# Patient Record
Sex: MALE | Race: BLACK OR AFRICAN AMERICAN | Employment: OTHER | URBAN - METROPOLITAN AREA
[De-identification: names, ages, dates, MRNs, and addresses within clinical notes are randomized per-mention and may not be internally consistent; named-entity substitution may affect disease eponyms.]

---

## 2018-09-01 ENCOUNTER — HOSPITAL ENCOUNTER (EMERGENCY)
Age: 28
Discharge: HOME | End: 2018-09-01
Attending: EMERGENCY MEDICINE
Payer: MEDICARE

## 2018-09-01 ENCOUNTER — APPOINTMENT (OUTPATIENT)
Dept: GENERAL RADIOLOGY | Age: 28
End: 2018-09-01
Payer: MEDICARE

## 2018-09-01 VITALS
RESPIRATION RATE: 16 BRPM | OXYGEN SATURATION: 100 % | TEMPERATURE: 98.7 F | WEIGHT: 302 LBS | SYSTOLIC BLOOD PRESSURE: 151 MMHG | HEART RATE: 107 BPM | HEIGHT: 71 IN | DIASTOLIC BLOOD PRESSURE: 94 MMHG | BODY MASS INDEX: 42.28 KG/M2

## 2018-09-01 DIAGNOSIS — R06.00 DYSPNEA, UNSPECIFIED TYPE: ICD-10-CM

## 2018-09-01 DIAGNOSIS — R73.9 HYPERGLYCEMIA: ICD-10-CM

## 2018-09-01 DIAGNOSIS — R05.9 COUGH: Primary | ICD-10-CM

## 2018-09-01 LAB
ANION GAP SERPL CALCULATED.3IONS-SCNC: 14 MMOL/L (ref 3–16)
BASOPHILS ABSOLUTE: 0 K/UL (ref 0–0.2)
BASOPHILS RELATIVE PERCENT: 0.6 %
BUN BLDV-MCNC: 9 MG/DL (ref 7–20)
CALCIUM SERPL-MCNC: 9.8 MG/DL (ref 8.3–10.6)
CHLORIDE BLD-SCNC: 94 MMOL/L (ref 99–110)
CO2: 25 MMOL/L (ref 21–32)
CREAT SERPL-MCNC: 0.7 MG/DL (ref 0.9–1.3)
EOSINOPHILS ABSOLUTE: 0.1 K/UL (ref 0–0.6)
EOSINOPHILS RELATIVE PERCENT: 1.9 %
GFR AFRICAN AMERICAN: >60
GFR NON-AFRICAN AMERICAN: >60
GLUCOSE BLD-MCNC: 329 MG/DL (ref 70–99)
HCT VFR BLD CALC: 41 % (ref 40.5–52.5)
HEMOGLOBIN: 13.4 G/DL (ref 13.5–17.5)
LYMPHOCYTES ABSOLUTE: 1.6 K/UL (ref 1–5.1)
LYMPHOCYTES RELATIVE PERCENT: 29.9 %
MCH RBC QN AUTO: 26.2 PG (ref 26–34)
MCHC RBC AUTO-ENTMCNC: 32.8 G/DL (ref 31–36)
MCV RBC AUTO: 80 FL (ref 80–100)
MONOCYTES ABSOLUTE: 0.2 K/UL (ref 0–1.3)
MONOCYTES RELATIVE PERCENT: 4.2 %
NEUTROPHILS ABSOLUTE: 3.5 K/UL (ref 1.7–7.7)
NEUTROPHILS RELATIVE PERCENT: 63.4 %
PDW BLD-RTO: 14.3 % (ref 12.4–15.4)
PLATELET # BLD: 405 K/UL (ref 135–450)
PMV BLD AUTO: 7.1 FL (ref 5–10.5)
POTASSIUM SERPL-SCNC: 4.4 MMOL/L (ref 3.5–5.1)
RBC # BLD: 5.12 M/UL (ref 4.2–5.9)
SODIUM BLD-SCNC: 133 MMOL/L (ref 136–145)
TROPONIN: <0.01 NG/ML
WBC # BLD: 5.5 K/UL (ref 4–11)

## 2018-09-01 PROCEDURE — 71045 X-RAY EXAM CHEST 1 VIEW: CPT

## 2018-09-01 PROCEDURE — 80048 BASIC METABOLIC PNL TOTAL CA: CPT

## 2018-09-01 PROCEDURE — 93005 ELECTROCARDIOGRAM TRACING: CPT | Performed by: EMERGENCY MEDICINE

## 2018-09-01 PROCEDURE — 99285 EMERGENCY DEPT VISIT HI MDM: CPT

## 2018-09-01 PROCEDURE — 84484 ASSAY OF TROPONIN QUANT: CPT

## 2018-09-01 PROCEDURE — 85025 COMPLETE CBC W/AUTO DIFF WBC: CPT

## 2018-09-01 ASSESSMENT — ENCOUNTER SYMPTOMS
ABDOMINAL PAIN: 0
SHORTNESS OF BREATH: 1
COUGH: 1
WHEEZING: 0

## 2018-09-01 NOTE — ED TRIAGE NOTES
Pt arrived to ED with complaints of productive cough for the past couple days. Last night while in parking lot had shortness of breath. States he has trouble breathing in high humidity. Denies SOB at this time. States \"I feel like I have a virus\". Pt currently sitting up in home wheelchair with no respiratory distress noted.

## 2018-09-01 NOTE — ED PROVIDER NOTES
[address] [facility]          ATTENDING PHYSICIAN NOTE       Date of evaluation: 9/1/2018    Chief Complaint     Cough (productive cough for the past couple days, feels like he has a cold) and Shortness of Breath (last night)      History of Present Illness     Emily Branch is a 25 y.o. 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 Patient reports over the past 2-3 days he has developed cough now becoming productive without hemoptysis. Patient also reports that he has dyspnea intermittently. Patient denies any chest pain, syncope, palpitations, fever. No previous history of blood clot. No recent trauma. Review of Systems     Review of Systems   Constitutional: Negative for fever. Respiratory: Positive for cough and shortness of breath. Negative for wheezing. Cardiovascular: Negative for chest pain and leg swelling. Gastrointestinal: Negative for abdominal pain. Neurological: Negative for syncope and light-headedness. All other systems reviewed and are negative. Past Medical, Surgical, Family, and Social History     He has a past medical history of Asthma; Hypertension; and Paralysis ([facility] [address] He has a past surgical history that includes Spine surgery (2009). His family history includes Asthma in his father; Diabetes in his father and mother; High Blood Pressure in his father. He reports that he has never smoked. He has never used smokeless tobacco. He reports that he does not drink alcohol or use drugs. Medications     Previous Medications    ACETAMINOPHEN (TYLENOL) 500 MG TABLET    Take 500 mg by mouth as needed. BACLOFEN (LIORESAL) 20 MG TABLET    Take 20 mg by mouth 3 times daily. CHOLECALCIFEROL (VITAMIN D3) 1000 UNITS TABS    TAKE 2 TABLETS BY MOUTH DAILY. GABAPENTIN (NEURONTIN) 600 MG TABLET    Take 600 mg by mouth 3 times daily.

## 2018-09-13 LAB
EKG ATRIAL RATE: 99 BPM
EKG DIAGNOSIS: NORMAL
EKG P AXIS: 127 DEGREES
EKG P-R INTERVAL: 138 MS
EKG Q-T INTERVAL: 342 MS
EKG QRS DURATION: 84 MS
EKG QTC CALCULATION (BAZETT): 438 MS
EKG R AXIS: 197 DEGREES
EKG T AXIS: 137 DEGREES
EKG VENTRICULAR RATE: 99 BPM

## 2024-08-06 ENCOUNTER — APPOINTMENT (OUTPATIENT)
Dept: GENERAL RADIOLOGY | Age: 34
End: 2024-08-06
Payer: MEDICARE

## 2024-08-06 ENCOUNTER — APPOINTMENT (OUTPATIENT)
Dept: CT IMAGING | Age: 34
End: 2024-08-06
Payer: MEDICARE

## 2024-08-06 ENCOUNTER — HOSPITAL ENCOUNTER (INPATIENT)
Age: 34
LOS: 3 days | Discharge: NURSING, REHAB OR LONG-TERM CARE | End: 2024-08-09
Attending: EMERGENCY MEDICINE | Admitting: INTERNAL MEDICINE
Payer: MEDICARE

## 2024-08-06 DIAGNOSIS — D64.9 ACUTE ANEMIA: Primary | ICD-10-CM

## 2024-08-06 DIAGNOSIS — R00.2 PALPITATIONS: ICD-10-CM

## 2024-08-06 PROBLEM — G82.20 PARAPLEGIA (HCC): Status: ACTIVE | Noted: 2024-08-06

## 2024-08-06 LAB
AMORPH SED URNS QL MICRO: ABNORMAL /HPF
ANION GAP SERPL CALCULATED.3IONS-SCNC: 16 MMOL/L (ref 3–16)
BACTERIA URNS QL MICRO: ABNORMAL /HPF
BASOPHILS # BLD: 0.1 K/UL (ref 0–0.2)
BASOPHILS NFR BLD: 1.3 %
BILIRUB UR QL STRIP.AUTO: NEGATIVE
BUN SERPL-MCNC: 21 MG/DL (ref 7–20)
CALCIUM SERPL-MCNC: 9.3 MG/DL (ref 8.3–10.6)
CHLORIDE SERPL-SCNC: 100 MMOL/L (ref 99–110)
CLARITY UR: CLEAR
CO2 SERPL-SCNC: 22 MMOL/L (ref 21–32)
COARSE GRAN CASTS #/AREA URNS LPF: ABNORMAL /LPF (ref 0–2)
COLOR UR: YELLOW
CREAT SERPL-MCNC: 1.3 MG/DL (ref 0.9–1.3)
DEPRECATED RDW RBC AUTO: 16.2 % (ref 12.4–15.4)
EKG ATRIAL RATE: 93 BPM
EKG DIAGNOSIS: NORMAL
EKG P AXIS: 73 DEGREES
EKG P-R INTERVAL: 152 MS
EKG Q-T INTERVAL: 366 MS
EKG QRS DURATION: 80 MS
EKG QTC CALCULATION (BAZETT): 455 MS
EKG R AXIS: 70 DEGREES
EKG T AXIS: 66 DEGREES
EKG VENTRICULAR RATE: 93 BPM
EOSINOPHIL # BLD: 0.2 K/UL (ref 0–0.6)
EOSINOPHIL NFR BLD: 2.1 %
EPI CELLS #/AREA URNS HPF: ABNORMAL /HPF (ref 0–5)
GFR SERPLBLD CREATININE-BSD FMLA CKD-EPI: 73 ML/MIN/{1.73_M2}
GLUCOSE SERPL-MCNC: 133 MG/DL (ref 70–99)
GLUCOSE UR STRIP.AUTO-MCNC: NEGATIVE MG/DL
HCT VFR BLD AUTO: 20.7 % (ref 40.5–52.5)
HGB BLD-MCNC: 6.5 G/DL (ref 13.5–17.5)
HGB UR QL STRIP.AUTO: ABNORMAL
KETONES UR STRIP.AUTO-MCNC: NEGATIVE MG/DL
LEUKOCYTE ESTERASE UR QL STRIP.AUTO: ABNORMAL
LYMPHOCYTES # BLD: 2 K/UL (ref 1–5.1)
LYMPHOCYTES NFR BLD: 22.5 %
MCH RBC QN AUTO: 26.9 PG (ref 26–34)
MCHC RBC AUTO-ENTMCNC: 31.3 G/DL (ref 31–36)
MCV RBC AUTO: 86.1 FL (ref 80–100)
MONOCYTES # BLD: 0.4 K/UL (ref 0–1.3)
MONOCYTES NFR BLD: 4.6 %
NEUTROPHILS # BLD: 6.1 K/UL (ref 1.7–7.7)
NEUTROPHILS NFR BLD: 69.5 %
NITRITE UR QL STRIP.AUTO: NEGATIVE
PH UR STRIP.AUTO: 6 [PH] (ref 5–8)
PLATELET # BLD AUTO: 716 K/UL (ref 135–450)
PMV BLD AUTO: 6.9 FL (ref 5–10.5)
POTASSIUM SERPL-SCNC: 4.4 MMOL/L (ref 3.5–5.1)
PROT UR STRIP.AUTO-MCNC: >=300 MG/DL
RBC # BLD AUTO: 2.4 M/UL (ref 4.2–5.9)
RBC #/AREA URNS HPF: ABNORMAL /HPF (ref 0–4)
SODIUM SERPL-SCNC: 138 MMOL/L (ref 136–145)
SP GR UR STRIP.AUTO: >=1.03 (ref 1–1.03)
UA COMPLETE W REFLEX CULTURE PNL UR: ABNORMAL
UA DIPSTICK W REFLEX MICRO PNL UR: YES
URN SPEC COLLECT METH UR: ABNORMAL
UROBILINOGEN UR STRIP-ACNC: 2 E.U./DL
WBC # BLD AUTO: 8.8 K/UL (ref 4–11)
WBC #/AREA URNS HPF: ABNORMAL /HPF (ref 0–5)

## 2024-08-06 PROCEDURE — 74177 CT ABD & PELVIS W/CONTRAST: CPT

## 2024-08-06 PROCEDURE — 1200000000 HC SEMI PRIVATE

## 2024-08-06 PROCEDURE — 87088 URINE BACTERIA CULTURE: CPT

## 2024-08-06 PROCEDURE — 36415 COLL VENOUS BLD VENIPUNCTURE: CPT

## 2024-08-06 PROCEDURE — 81001 URINALYSIS AUTO W/SCOPE: CPT

## 2024-08-06 PROCEDURE — 6370000000 HC RX 637 (ALT 250 FOR IP): Performed by: STUDENT IN AN ORGANIZED HEALTH CARE EDUCATION/TRAINING PROGRAM

## 2024-08-06 PROCEDURE — 87186 SC STD MICRODIL/AGAR DIL: CPT

## 2024-08-06 PROCEDURE — 85025 COMPLETE CBC W/AUTO DIFF WBC: CPT

## 2024-08-06 PROCEDURE — 99285 EMERGENCY DEPT VISIT HI MDM: CPT

## 2024-08-06 PROCEDURE — 71045 X-RAY EXAM CHEST 1 VIEW: CPT

## 2024-08-06 PROCEDURE — 83540 ASSAY OF IRON: CPT

## 2024-08-06 PROCEDURE — 6360000004 HC RX CONTRAST MEDICATION: Performed by: STUDENT IN AN ORGANIZED HEALTH CARE EDUCATION/TRAINING PROGRAM

## 2024-08-06 PROCEDURE — 87086 URINE CULTURE/COLONY COUNT: CPT

## 2024-08-06 PROCEDURE — 80048 BASIC METABOLIC PNL TOTAL CA: CPT

## 2024-08-06 PROCEDURE — 83550 IRON BINDING TEST: CPT

## 2024-08-06 PROCEDURE — 93005 ELECTROCARDIOGRAM TRACING: CPT | Performed by: STUDENT IN AN ORGANIZED HEALTH CARE EDUCATION/TRAINING PROGRAM

## 2024-08-06 RX ORDER — SODIUM CHLORIDE 9 MG/ML
INJECTION, SOLUTION INTRAVENOUS PRN
Status: DISCONTINUED | OUTPATIENT
Start: 2024-08-06 | End: 2024-08-06

## 2024-08-06 RX ORDER — LISINOPRIL AND HYDROCHLOROTHIAZIDE 12.5; 1 MG/1; MG/1
1 TABLET ORAL DAILY
Status: DISCONTINUED | OUTPATIENT
Start: 2024-08-07 | End: 2024-08-06

## 2024-08-06 RX ORDER — VITAMIN B COMPLEX
2000 TABLET ORAL DAILY
Status: DISCONTINUED | OUTPATIENT
Start: 2024-08-07 | End: 2024-08-09 | Stop reason: HOSPADM

## 2024-08-06 RX ORDER — ACETAMINOPHEN 650 MG/1
650 SUPPOSITORY RECTAL EVERY 6 HOURS PRN
Status: DISCONTINUED | OUTPATIENT
Start: 2024-08-06 | End: 2024-08-07

## 2024-08-06 RX ORDER — ONDANSETRON 4 MG/1
4 TABLET, ORALLY DISINTEGRATING ORAL EVERY 8 HOURS PRN
Status: DISCONTINUED | OUTPATIENT
Start: 2024-08-06 | End: 2024-08-09 | Stop reason: HOSPADM

## 2024-08-06 RX ORDER — TIZANIDINE 4 MG/1
4 TABLET ORAL ONCE
Status: COMPLETED | OUTPATIENT
Start: 2024-08-06 | End: 2024-08-06

## 2024-08-06 RX ORDER — POLYETHYLENE GLYCOL 3350 17 G/17G
17 POWDER, FOR SOLUTION ORAL DAILY PRN
Status: DISCONTINUED | OUTPATIENT
Start: 2024-08-06 | End: 2024-08-09 | Stop reason: HOSPADM

## 2024-08-06 RX ORDER — ONDANSETRON 2 MG/ML
4 INJECTION INTRAMUSCULAR; INTRAVENOUS EVERY 6 HOURS PRN
Status: DISCONTINUED | OUTPATIENT
Start: 2024-08-06 | End: 2024-08-09 | Stop reason: HOSPADM

## 2024-08-06 RX ORDER — BACLOFEN 10 MG/1
20 TABLET ORAL 3 TIMES DAILY
Status: DISCONTINUED | OUTPATIENT
Start: 2024-08-06 | End: 2024-08-09 | Stop reason: HOSPADM

## 2024-08-06 RX ORDER — NITROFURANTOIN 25; 75 MG/1; MG/1
100 CAPSULE ORAL 2 TIMES DAILY
Status: ON HOLD | COMMUNITY
End: 2024-08-09 | Stop reason: HOSPADM

## 2024-08-06 RX ORDER — BISACODYL 10 MG
10 SUPPOSITORY, RECTAL RECTAL DAILY
COMMUNITY

## 2024-08-06 RX ORDER — LISINOPRIL 10 MG/1
10 TABLET ORAL DAILY
Status: DISCONTINUED | OUTPATIENT
Start: 2024-08-07 | End: 2024-08-07

## 2024-08-06 RX ORDER — ENOXAPARIN SODIUM 100 MG/ML
30 INJECTION SUBCUTANEOUS 2 TIMES DAILY
Status: DISCONTINUED | OUTPATIENT
Start: 2024-08-06 | End: 2024-08-07

## 2024-08-06 RX ORDER — ACETAMINOPHEN 325 MG/1
650 TABLET ORAL EVERY 6 HOURS PRN
Status: DISCONTINUED | OUTPATIENT
Start: 2024-08-06 | End: 2024-08-07

## 2024-08-06 RX ORDER — SODIUM CHLORIDE 9 MG/ML
INJECTION, SOLUTION INTRAVENOUS PRN
Status: DISCONTINUED | OUTPATIENT
Start: 2024-08-06 | End: 2024-08-09 | Stop reason: HOSPADM

## 2024-08-06 RX ORDER — AMLODIPINE BESYLATE 10 MG/1
10 TABLET ORAL DAILY
COMMUNITY

## 2024-08-06 RX ORDER — GABAPENTIN 600 MG/1
600 TABLET ORAL 3 TIMES DAILY
Status: DISCONTINUED | OUTPATIENT
Start: 2024-08-06 | End: 2024-08-09 | Stop reason: HOSPADM

## 2024-08-06 RX ORDER — BACLOFEN 10 MG/1
20 TABLET ORAL ONCE
Status: COMPLETED | OUTPATIENT
Start: 2024-08-06 | End: 2024-08-06

## 2024-08-06 RX ORDER — POTASSIUM CHLORIDE 7.45 MG/ML
10 INJECTION INTRAVENOUS PRN
Status: DISCONTINUED | OUTPATIENT
Start: 2024-08-06 | End: 2024-08-09 | Stop reason: HOSPADM

## 2024-08-06 RX ORDER — SODIUM CHLORIDE 0.9 % (FLUSH) 0.9 %
5-40 SYRINGE (ML) INJECTION EVERY 12 HOURS SCHEDULED
Status: DISCONTINUED | OUTPATIENT
Start: 2024-08-06 | End: 2024-08-09 | Stop reason: HOSPADM

## 2024-08-06 RX ORDER — HYDROCHLOROTHIAZIDE 25 MG/1
12.5 TABLET ORAL DAILY
Status: DISCONTINUED | OUTPATIENT
Start: 2024-08-07 | End: 2024-08-07

## 2024-08-06 RX ORDER — POTASSIUM CHLORIDE 20 MEQ/1
40 TABLET, EXTENDED RELEASE ORAL PRN
Status: DISCONTINUED | OUTPATIENT
Start: 2024-08-06 | End: 2024-08-09 | Stop reason: HOSPADM

## 2024-08-06 RX ORDER — SODIUM CHLORIDE 0.9 % (FLUSH) 0.9 %
5-40 SYRINGE (ML) INJECTION PRN
Status: DISCONTINUED | OUTPATIENT
Start: 2024-08-06 | End: 2024-08-09 | Stop reason: HOSPADM

## 2024-08-06 RX ORDER — MAGNESIUM SULFATE IN WATER 40 MG/ML
2000 INJECTION, SOLUTION INTRAVENOUS PRN
Status: DISCONTINUED | OUTPATIENT
Start: 2024-08-06 | End: 2024-08-09 | Stop reason: HOSPADM

## 2024-08-06 RX ADMIN — IOPAMIDOL 75 ML: 755 INJECTION, SOLUTION INTRAVENOUS at 19:12

## 2024-08-06 RX ADMIN — TIZANIDINE 4 MG: 4 TABLET ORAL at 15:50

## 2024-08-06 RX ADMIN — BACLOFEN 20 MG: 10 TABLET ORAL at 15:51

## 2024-08-06 ASSESSMENT — PAIN SCALES - GENERAL
PAINLEVEL_OUTOF10: 0
PAINLEVEL_OUTOF10: 7

## 2024-08-06 ASSESSMENT — PAIN - FUNCTIONAL ASSESSMENT: PAIN_FUNCTIONAL_ASSESSMENT: 0-10

## 2024-08-06 ASSESSMENT — LIFESTYLE VARIABLES
HOW MANY STANDARD DRINKS CONTAINING ALCOHOL DO YOU HAVE ON A TYPICAL DAY: PATIENT DOES NOT DRINK
HOW OFTEN DO YOU HAVE A DRINK CONTAINING ALCOHOL: NEVER

## 2024-08-06 NOTE — CONSENT
Informed Refusal for Blood Component Transfusion Note    I have discussed with the patient and mother the rationale for blood component transfusion; its benefits in treating or preventing fatigue, organ damage, or death; and its risk which includes mild transfusion reactions, rare risk of blood borne infection, or more serious but rare reactions. I have discussed the alternatives to transfusion, including the risk and consequences of not receiving transfusion. The patient and mother had an opportunity to ask questions and had REFUSED to proceed with transfusion of packed red blood cells. Patient's mother is a Bahai and patient is affiliated w/ the same james. Discussed alternatives to blood transfusion and patient would prefer to try iron supplementation prior to transfusion.       Electronically signed by Jw Winchester MD on 8/6/24 at 5:10 PM EDT

## 2024-08-06 NOTE — ED PROVIDER NOTES
ED Attending Attestation Note     Date of evaluation: 8/6/2024    This patient was seen by the resident.  I have seen and examined the patient, agree with the workup, evaluation, management and diagnosis. The care plan has been discussed.  I have reviewed the ECG and concur with the resident's interpretation.  My assessment reveals adult male in no acute distress, here initially for evaluation of hypertension, but noted on labs to have significant anemia, hemoglobin 6.5.  He denies any significant bleeding recently, nothing history wise that would seem to explain why he would be is critically anemic as he was found to be.  Denies SOB/ CP/ fatigue.  Risk/ benefit of blood transfusion was discussed, pt wishes to defer blood transfusion at this time.        Dane Vargas MD  08/06/24 2025

## 2024-08-06 NOTE — ED PROVIDER NOTES
THE [facility]  EMERGENCY DEPARTMENT ENCOUNTER          EM RESIDENT NOTE       Date of evaluation: 8/6/2024    Chief Complaint     Hypertension (Patient comes to the ED today for hypertension that started about two days ago. )    History of Present Illness     Russell Livingston is a 33 y.o. male w/ PMHx paraplegia 2/2 T10/T11 traumatic SCI, asthma, HTN, recently seen at urgent care for dark, malodorous urine and dx'd w/ UTI (completed Macrobid x3 days) who presents via EMS for evaluation of multiple complaints.  Has noted intermittent palpitations over last several days that prompted him to check his BP at home today, noted elevation to SBP 170s.  Called EMS and SBP elevated to 210s on initial evaluation.  Denies HA, vision changes, mental status changes, chest pain, dyspnea, presyncope/syncope, fever, cough a/w palpitations but reports they have become more frequent since taking Macrobid.  Additionally reports that he was initially prescribed a longer course of Macrobid but was instructed to stop taking this medication d/t flushing/heated sensation c/f allergic reaction.  Denies associated fevers, abdominal pain, hematuria or N/V/D.  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     Additionally endorses progressive paraesthesias of his palmar R thumb and 2nd/3rd digits that began while helping his mother lift a pallet of plastic water bottles.  Felt something \"pop\" in his wrist at the time and has noted paraesthesias/numbness in affected fingers since.  Denies any recent falls, injury to neck or proximal RUE.  No wounds.  No dorsal sensory changes.     MEDICAL DECISION MAKING / ASSESSMENT / PLAN     INITIAL VITALS: BP: (!) 179/99, Temp: 99.2 °F (37.3 °C), Pulse: (!) 104, Respirations: 20, SpO2: 95 %    Russell Livingston is a 33 y.o. male presenting for evaluation of multiple

## 2024-08-06 NOTE — ED NOTES
Md Winchester notified of critical result of hemoglobin and hematocrit     Santos Mohamud, RN  08/06/24 6962

## 2024-08-07 LAB
ABO + RH BLD: NORMAL
ANION GAP SERPL CALCULATED.3IONS-SCNC: 13 MMOL/L (ref 3–16)
BASOPHILS # BLD: 0 K/UL (ref 0–0.2)
BASOPHILS NFR BLD: 0.6 %
BLD GP AB SCN SERPL QL: NORMAL
BUN SERPL-MCNC: 20 MG/DL (ref 7–20)
CALCIUM SERPL-MCNC: 8.8 MG/DL (ref 8.3–10.6)
CHLORIDE SERPL-SCNC: 101 MMOL/L (ref 99–110)
CO2 SERPL-SCNC: 24 MMOL/L (ref 21–32)
CREAT SERPL-MCNC: 1.3 MG/DL (ref 0.9–1.3)
DEPRECATED RDW RBC AUTO: 16 % (ref 12.4–15.4)
EOSINOPHIL # BLD: 0.2 K/UL (ref 0–0.6)
EOSINOPHIL NFR BLD: 2.8 %
FERRITIN SERPL IA-MCNC: 863.1 NG/ML (ref 30–400)
FOLATE SERPL-MCNC: 19.02 NG/ML (ref 4.78–24.2)
GFR SERPLBLD CREATININE-BSD FMLA CKD-EPI: 73 ML/MIN/{1.73_M2}
GLUCOSE SERPL-MCNC: 154 MG/DL (ref 70–99)
HCT VFR BLD AUTO: 18.5 % (ref 40.5–52.5)
HGB BLD-MCNC: 5.8 G/DL (ref 13.5–17.5)
IRON SATN MFR SERPL: 24 % (ref 20–50)
IRON SERPL-MCNC: 69 UG/DL (ref 59–158)
LDH SERPL L TO P-CCNC: 298 U/L (ref 100–190)
LYMPHOCYTES # BLD: 2.3 K/UL (ref 1–5.1)
LYMPHOCYTES NFR BLD: 34.1 %
MCH RBC QN AUTO: 26.8 PG (ref 26–34)
MCHC RBC AUTO-ENTMCNC: 31.3 G/DL (ref 31–36)
MCV RBC AUTO: 85.8 FL (ref 80–100)
MONOCYTES # BLD: 0.3 K/UL (ref 0–1.3)
MONOCYTES NFR BLD: 4.2 %
NEUTROPHILS # BLD: 3.9 K/UL (ref 1.7–7.7)
NEUTROPHILS NFR BLD: 58.3 %
PLATELET # BLD AUTO: 570 K/UL (ref 135–450)
PMV BLD AUTO: 6.6 FL (ref 5–10.5)
POTASSIUM SERPL-SCNC: 3.9 MMOL/L (ref 3.5–5.1)
RBC # BLD AUTO: 2.16 M/UL (ref 4.2–5.9)
SODIUM SERPL-SCNC: 138 MMOL/L (ref 136–145)
TIBC SERPL-MCNC: 293 UG/DL (ref 260–445)
VIT B12 SERPL-MCNC: 443 PG/ML (ref 211–911)
WBC # BLD AUTO: 6.6 K/UL (ref 4–11)

## 2024-08-07 PROCEDURE — 6370000000 HC RX 637 (ALT 250 FOR IP): Performed by: NURSE PRACTITIONER

## 2024-08-07 PROCEDURE — 1200000000 HC SEMI PRIVATE

## 2024-08-07 PROCEDURE — 83615 LACTATE (LD) (LDH) ENZYME: CPT

## 2024-08-07 PROCEDURE — 86900 BLOOD TYPING SEROLOGIC ABO: CPT

## 2024-08-07 PROCEDURE — 82668 ASSAY OF ERYTHROPOIETIN: CPT

## 2024-08-07 PROCEDURE — 2580000003 HC RX 258: Performed by: INTERNAL MEDICINE

## 2024-08-07 PROCEDURE — 82607 VITAMIN B-12: CPT

## 2024-08-07 PROCEDURE — 86850 RBC ANTIBODY SCREEN: CPT

## 2024-08-07 PROCEDURE — 83550 IRON BINDING TEST: CPT

## 2024-08-07 PROCEDURE — 85025 COMPLETE CBC W/AUTO DIFF WBC: CPT

## 2024-08-07 PROCEDURE — 82746 ASSAY OF FOLIC ACID SERUM: CPT

## 2024-08-07 PROCEDURE — 6360000002 HC RX W HCPCS: Performed by: INTERNAL MEDICINE

## 2024-08-07 PROCEDURE — 83540 ASSAY OF IRON: CPT

## 2024-08-07 PROCEDURE — 6370000000 HC RX 637 (ALT 250 FOR IP): Performed by: INTERNAL MEDICINE

## 2024-08-07 PROCEDURE — 80048 BASIC METABOLIC PNL TOTAL CA: CPT

## 2024-08-07 PROCEDURE — 82728 ASSAY OF FERRITIN: CPT

## 2024-08-07 PROCEDURE — 6360000002 HC RX W HCPCS: Performed by: NURSE PRACTITIONER

## 2024-08-07 PROCEDURE — 83010 ASSAY OF HAPTOGLOBIN QUANT: CPT

## 2024-08-07 PROCEDURE — 36415 COLL VENOUS BLD VENIPUNCTURE: CPT

## 2024-08-07 PROCEDURE — 86901 BLOOD TYPING SEROLOGIC RH(D): CPT

## 2024-08-07 RX ORDER — TIZANIDINE 4 MG/1
4 TABLET ORAL EVERY 8 HOURS PRN
Status: COMPLETED | OUTPATIENT
Start: 2024-08-07 | End: 2024-08-07

## 2024-08-07 RX ORDER — ACETAMINOPHEN 500 MG
1000 TABLET ORAL EVERY 6 HOURS PRN
Status: DISCONTINUED | OUTPATIENT
Start: 2024-08-07 | End: 2024-08-09 | Stop reason: HOSPADM

## 2024-08-07 RX ORDER — AMLODIPINE BESYLATE 10 MG/1
10 TABLET ORAL DAILY
Status: DISCONTINUED | OUTPATIENT
Start: 2024-08-07 | End: 2024-08-09 | Stop reason: HOSPADM

## 2024-08-07 RX ORDER — OXYBUTYNIN CHLORIDE 5 MG/1
5 TABLET ORAL 3 TIMES DAILY
Status: DISCONTINUED | OUTPATIENT
Start: 2024-08-07 | End: 2024-08-09 | Stop reason: HOSPADM

## 2024-08-07 RX ORDER — BISACODYL 5 MG/1
5 TABLET, DELAYED RELEASE ORAL DAILY PRN
Status: DISCONTINUED | OUTPATIENT
Start: 2024-08-07 | End: 2024-08-09 | Stop reason: HOSPADM

## 2024-08-07 RX ORDER — FOLIC ACID 1 MG/1
1 TABLET ORAL DAILY
Status: DISCONTINUED | OUTPATIENT
Start: 2024-08-07 | End: 2024-08-09 | Stop reason: HOSPADM

## 2024-08-07 RX ORDER — BISACODYL 10 MG
10 SUPPOSITORY, RECTAL RECTAL DAILY PRN
Status: DISCONTINUED | OUTPATIENT
Start: 2024-08-07 | End: 2024-08-09 | Stop reason: HOSPADM

## 2024-08-07 RX ORDER — CYANOCOBALAMIN 1000 UG/ML
1000 INJECTION, SOLUTION INTRAMUSCULAR; SUBCUTANEOUS DAILY
Status: DISCONTINUED | OUTPATIENT
Start: 2024-08-07 | End: 2024-08-09 | Stop reason: HOSPADM

## 2024-08-07 RX ORDER — OXYBUTYNIN CHLORIDE 5 MG/1
5 TABLET ORAL
Status: COMPLETED | OUTPATIENT
Start: 2024-08-07 | End: 2024-08-07

## 2024-08-07 RX ORDER — SODIUM CHLORIDE 9 MG/ML
INJECTION, SOLUTION INTRAVENOUS PRN
Status: DISCONTINUED | OUTPATIENT
Start: 2024-08-07 | End: 2024-08-07

## 2024-08-07 RX ORDER — TIZANIDINE 4 MG/1
4 TABLET ORAL 3 TIMES DAILY
Status: DISCONTINUED | OUTPATIENT
Start: 2024-08-07 | End: 2024-08-09 | Stop reason: HOSPADM

## 2024-08-07 RX ADMIN — OXYBUTYNIN CHLORIDE 5 MG: 5 TABLET ORAL at 15:44

## 2024-08-07 RX ADMIN — Medication 2000 UNITS: at 08:30

## 2024-08-07 RX ADMIN — TIZANIDINE 4 MG: 4 TABLET ORAL at 21:13

## 2024-08-07 RX ADMIN — BACLOFEN 20 MG: 10 TABLET ORAL at 08:32

## 2024-08-07 RX ADMIN — FOLIC ACID 1 MG: 1 TABLET ORAL at 14:00

## 2024-08-07 RX ADMIN — PIPERACILLIN AND TAZOBACTAM 4500 MG: 4; .5 INJECTION, POWDER, LYOPHILIZED, FOR SOLUTION INTRAVENOUS at 15:51

## 2024-08-07 RX ADMIN — GABAPENTIN 600 MG: 600 TABLET, FILM COATED ORAL at 00:37

## 2024-08-07 RX ADMIN — BACLOFEN 20 MG: 10 TABLET ORAL at 00:37

## 2024-08-07 RX ADMIN — CYANOCOBALAMIN 1000 MCG: 1000 INJECTION, SOLUTION INTRAMUSCULAR; SUBCUTANEOUS at 13:32

## 2024-08-07 RX ADMIN — TIZANIDINE 4 MG: 4 TABLET ORAL at 15:44

## 2024-08-07 RX ADMIN — SODIUM CHLORIDE, PRESERVATIVE FREE 10 ML: 5 INJECTION INTRAVENOUS at 09:29

## 2024-08-07 RX ADMIN — OXYBUTYNIN CHLORIDE 5 MG: 5 TABLET ORAL at 01:23

## 2024-08-07 RX ADMIN — SODIUM CHLORIDE 200 MG: 9 INJECTION, SOLUTION INTRAVENOUS at 18:57

## 2024-08-07 RX ADMIN — OXYBUTYNIN CHLORIDE 5 MG: 5 TABLET ORAL at 21:13

## 2024-08-07 RX ADMIN — EPOETIN ALFA-EPBX 10000 UNITS: 10000 INJECTION, SOLUTION INTRAVENOUS; SUBCUTANEOUS at 18:47

## 2024-08-07 RX ADMIN — GABAPENTIN 600 MG: 600 TABLET, FILM COATED ORAL at 08:30

## 2024-08-07 RX ADMIN — BACLOFEN 20 MG: 10 TABLET ORAL at 21:13

## 2024-08-07 RX ADMIN — GABAPENTIN 600 MG: 600 TABLET, FILM COATED ORAL at 21:13

## 2024-08-07 RX ADMIN — TIZANIDINE 4 MG: 4 TABLET ORAL at 10:41

## 2024-08-07 RX ADMIN — SODIUM CHLORIDE, PRESERVATIVE FREE 10 ML: 5 INJECTION INTRAVENOUS at 01:23

## 2024-08-07 RX ADMIN — AMLODIPINE BESYLATE 10 MG: 10 TABLET ORAL at 13:33

## 2024-08-07 RX ADMIN — BACLOFEN 20 MG: 10 TABLET ORAL at 15:44

## 2024-08-07 RX ADMIN — PIPERACILLIN AND TAZOBACTAM 3375 MG: 3; .375 INJECTION, POWDER, LYOPHILIZED, FOR SOLUTION INTRAVENOUS at 21:50

## 2024-08-07 RX ADMIN — SODIUM CHLORIDE, PRESERVATIVE FREE 10 ML: 5 INJECTION INTRAVENOUS at 21:13

## 2024-08-07 RX ADMIN — GABAPENTIN 600 MG: 600 TABLET, FILM COATED ORAL at 15:44

## 2024-08-07 RX ADMIN — TIZANIDINE 4 MG: 4 TABLET ORAL at 01:23

## 2024-08-07 ASSESSMENT — PAIN DESCRIPTION - LOCATION
LOCATION: LEG
LOCATION: LEG

## 2024-08-07 ASSESSMENT — PAIN SCALES - GENERAL
PAINLEVEL_OUTOF10: 4
PAINLEVEL_OUTOF10: 7

## 2024-08-07 ASSESSMENT — PAIN DESCRIPTION - DESCRIPTORS
DESCRIPTORS: DISCOMFORT
DESCRIPTORS: BURNING

## 2024-08-07 NOTE — CONSULTS
08/07/24  0447   *         Radiology Review:  CT ABDOMEN PELVIS W IV CONTRAST Additional Contrast? None  Narrative: EXAM: CT ABDOMEN AND PELVIS WITH CONTRAST    INDICATION: Unexplained acute anemia    COMPARISON: None.    TECHNIQUE: Axial CT imaging obtained from lung bases through pelvis. Axial images and multiplanar reformatted images are provided for review.   Individualized dose optimization technique was used in order to meet ALARA standards for radiation dose   reduction.  In addition to vendor specific dose reduction algorithms, the dose reduction techniques vary based on the specific scanner utilized but frequently include automated exposure control, adjustment of the mA and/or kV according to patient size,   and use of iterative reconstruction technique.    IV Contrast: 75 mL of Isovue 370  Oral Contrast: None.    FINDINGS:    LUNG BASES: Clear.    LIVER: Normal.    GALLBLADDER AND BILIARY TREE: No calcified gallstones. No gallbladder distention.  No intra- or extrahepatic biliary dilatation.    PANCREAS: Normal.    SPLEEN: Normal.    ADRENAL GLANDS: Normal.    KIDNEYS AND URETERS: No hydronephrosis. Normal enhancement. No urolithiasis.    URINARY BLADDER: Normal.    REPRODUCTIVE ORGANS: No mass.    BOWEL: Normal caliber.  Normal appendix.    LYMPH NODES: There are enlarged bilateral iliac lymph nodes. For example, left external iliac lymph node measures 2.6 x 1.7 cm on series 2 image 107. Right common iliac lymph node measures 2.4 x 1.6 cm on series 2 image 77. Again, but for    PERITONEUM/RETROPERITONEUM: No ascites or free air.    VESSELS: Aorta is normal caliber and proximal branch vessels are patent.  The inferior vena cava, hepatic veins and portal venous system are patent.    ABDOMINAL WALL: Normal.    BONES: No acute osseous process. Heterotopic ossifications are noted along the bilateral hips.    There is streak artifact from imaging with arms down position which limits  Discussed side effects with the patient and his mother     We discussed the normal blood parameters and I explained the seriousness of this level of anemia and potential risks associated with this     He could also have some component of anemia of chronic disease secondary to his HTN. Cr at upper limits of normal     Chronic UTI  He reports UTI symptoms beginning 9/2021   Completed a 3 day course of antibiotics last week   UA reveals moderate amount of blood, WBC, bacteria  Urine culture is pending   He straight caths due to his spinal cord injury  Will consult Urology for an opinion     HTN  Management per primary team     Thank you for asking me to see the patient.       Linda Mendoza, APRN - CNP  Please Contact Through Perfect Serve    55 mins total time was spent on this visit today which includes chart review, outside records review, placing orders, my exam of the patient, and documentation

## 2024-08-07 NOTE — H&P
Hospital Medicine History & Physical      PCP: Unknown, Provider, PETE - NP    Date of Admission: 8/6/2024    Date of Service: Pt seen/examined on 8/6/2024 and Admitted to Inpatient with expected LOS greater than two midnights due to medical therapy.     Chief Complaint:    Chief Complaint   Patient presents with    Hypertension     Patient comes to the ED today for hypertension that started about two days ago.          History Of Present Illness:    The patient is a 33 y.o. male with history of hypertension, asthma, paraplegia secondary to spinal cord injury he had been under recent stress and presents with palpitations over the last several days.  He denies any chest pains or shortness of breath.  No dizziness.  Patient denies any melena or hematemesis or hematochezia.  However he did not dose a few months history of hematuria which improved within antibiotics last week.  I wonder if this may be precipitant of his anemia.    In the ER, laboratory workup was noted for hemoglobin of 5.8.  CT abdomen pelvis with no acute intraabdominal/pelvic pathology  Chest x-ray clear lung fields  Mandaeism reasons, patient declined blood products and opted for blood subsequence      Past Medical History:        Diagnosis Date    Asthma 5/20/2011    Hypertension 5/20/2011    Paralysis (HCC) 2009    lower extremity       Past Surgical History:        Procedure Laterality Date    SPINE SURGERY  2009    see overview.       Medications Prior to Admission:    Prior to Admission medications    Medication Sig Start Date End Date Taking? Authorizing Provider   naproxen (NAPROSYN) 500 MG tablet Take 1 tablet by mouth 2 times daily as needed for Pain 6/26/18   Bob Craft MD   lisinopril-hydrochlorothiazide (PRINZIDE;ZESTORETIC) 10-12.5 MG per tablet TAKE 1 TABLET BY MOUTH ONCE DAILY. 9/27/13   Zac Nunez MD   Cholecalciferol (VITAMIN D3) 1000 UNITS TABS TAKE 2 TABLETS BY MOUTH DAILY. 10/3/12   Zac Nunez MD    Skin: Skin color, texture, turgor normal.  No rashes or lesions.  Neurologic:, neurovascularly intact with sensory/motor intact upper extremities/lower extremities, bilaterally.  Cranial nerves: II-XII intact, grossly non-focal. Pupils equal, round, and reactive to light.  Extra ocular muscles intact.        CBC   Recent Labs     08/06/24  1603 08/06/24  1818   WBC 8.8  --    HGB 6.5* 5.8*   HCT 20.7* 18.1*   *  --       RENAL  Recent Labs     08/06/24  1603      K 4.4      CO2 22   BUN 21*   CREATININE 1.3     U/A:    Lab Results   Component Value Date/Time    COLORU Yellow 08/06/2024 04:29 PM    WBCUA 3-5 08/06/2024 04:29 PM    RBCUA 3-4 08/06/2024 04:29 PM    MUCUS Present 10/01/2013 03:51 PM    BACTERIA 3+ 08/06/2024 04:29 PM    CLARITYU Clear 08/06/2024 04:29 PM    LEUKOCYTESUR TRACE 08/06/2024 04:29 PM    BLOODU MODERATE 08/06/2024 04:29 PM    GLUCOSEU Negative 08/06/2024 04:29 PM    AMORPHOUS 4+ 08/06/2024 04:29 PM         Active Hospital Problems    Diagnosis Date Noted    Symptomatic anemia [D64.9] 08/06/2024    Paraplegia (HCC) [G82.20] 08/06/2024    Hypertension [I10] 05/20/2011         ASSESSMENT/PLAN:  33 y.o. male with history of hypertension, asthma, paraplegia secondary to spinal cord injury he had been under recent stress and presents with palpitations found to have severe anemia.  Spiritism reasons, patient decline blood products and opted for substitute.    Plan:  - Will check iron levels and give iron infusion  - If iron levels adequate, to consider erythropoietin  - Hematology consult  - Continue BP med occasions        DVT Prophylaxis: Subcut enoxaparin   Diet: No diet orders on file  Code Status: No Order       Bernice Suazo MD    Thank you Unknown, Provider, APRN - NP for the opportunity to be involved in this patient's care. If you have any questions or concerns please feel free to contact me at [phone].

## 2024-08-07 NOTE — PROGRESS NOTES
Patient seen and examined  Patient is otherwise stable today  He has no complaints; patient states he has had no gi issues, he does however state that he has had ongoing hematuria as he self cath's.  This has been worsening over the past month.    Vitals:    08/07/24 1502   BP: (!) 154/85   Pulse: 99   Resp: 18   Temp: 98.5 °F (36.9 °C)   SpO2: 96%       Gen: pleasant alert oriented family at bedside  Neck: no jvd  Chest: clear bilaterally  Cvs: s2s1 no murmurs  Abd: soft nd nt bs normal active  Ext: no edema positive pulses    H/h is 5.8 and 18.5 [id number]    Urine w mod blood leuk esterase and mucus w white cells    34 yo male admitted with anemia secondary to acute blood loss possibly from a genitourinary source.  The patient denies having any gi issues or gi source of blood loss.  His bm and oral intake have been normal.  I have consulted urology and added antibiotics given his history of recurrent UTI's.  Await urine culture.  Discussed case at length with family limit blood draws.

## 2024-08-07 NOTE — PROGRESS NOTES
Clinical Pharmacy Consult Note  Medication History     Admit Date: 8/6/2024    List of of current medications patient is taking is complete. Home Medication list in EPIC updated to reflect changes noted below.    Source of information: Patient, dispense history      Patient's home pharmacy: [facility] [id number] [place] [facility] - [place] [id number] [facility] - P [phone] - F [phone]      Changes made to medication list:   Medications removed: (include reason, ex: therapy completed, patient no longer taking, etc.)  Lisinopril-hydrochlorothiazide 10-12.5 mg tablet - patient reports not taking   Naproxen 500 mg tablet - patient no longer taking  Medications added:   Amlodipine 10 mg tablet   Bisacodyl 10 mg suppository   Nitrofurantoin monohydrate macrocrystals 100 mg capsule  Medication doses adjusted:   None    Other notes:   Baclofen 20 mg tablet - per dispense history last fill was a 540 tablets for a 90 day supply, patient reports taking 20 mg three times daily, which only accounts for 270 tablets. Patient reports that he only takes 1 tablet for each dose    Current Outpatient Medications   Medication Instructions    acetaminophen (TYLENOL) 500 mg, Oral, PRN,      amLODIPine (NORVASC) 10 mg, Oral, DAILY    baclofen (LIORESAL) 20 mg, Oral, 3 TIMES DAILY,      bisacodyl (DULCOLAX) 10 mg, Rectal, DAILY    Cholecalciferol (VITAMIN D3) 1000 UNITS TABS TAKE 2 TABLETS BY MOUTH DAILY.    gabapentin (NEURONTIN) 600 mg, Oral, 3 TIMES DAILY,      Multiple Vitamin (MULTIVITAMIN PO) Oral, DAILY,      nitrofurantoin (macrocrystal-monohydrate) (MACROBID) 100 mg, Oral, 2 TIMES DAILY    oxyBUTYnin (DITROPAN) 5 mg, Oral, PRN, For abdominal pain     tiZANidine (ZANAFLEX) 4 mg, Oral, EVERY 8 HOURS PRN,         Please call with any questions.    Janeth Borden, PharmD Candidate

## 2024-08-07 NOTE — CONSULTS
Patient Name: Russell Livingston  : 1990  Age: 33 y.o.  Sex: male    INDICATION FOR CONSULT: gross hematuria; anemia    History of Present Illness: This is a very pleasant 33-year-old gentleman with spinal cord injury in the lower thoracic spine.  He has resultant neurogenic bladder for which she is dependent on self-catheterization for many years.  He is presently admitted with symptomatic anemia.  Urology was consulted for intermittent issues with gross hematuria since at least .  Per the patient's report his urine intermittently has very mild blood tinge with cloudiness.  He denies any associated pain or fevers.  He had a CT scan upon admission that does not demonstrate any upper tract pathology.  His bladder is relatively decompressed and smooth-walled with a symmetric bladder wall.        ALLERGY:  Allergies   Allergen Reactions    Sulfa Antibiotics Rash       HOME MEDICATIONS:  Medications Prior to Admission: amLODIPine (NORVASC) 10 MG tablet, Take 1 tablet by mouth daily  bisacodyl (DULCOLAX) 10 MG suppository, Place 1 suppository rectally daily  nitrofurantoin, macrocrystal-monohydrate, (MACROBID) 100 MG capsule, Take 1 capsule by mouth 2 times daily  Cholecalciferol (VITAMIN D3) 1000 UNITS TABS, TAKE 2 TABLETS BY MOUTH DAILY.  tizanidine (ZANAFLEX) 4 MG tablet, Take 1 tablet by mouth every 8 hours as needed  gabapentin (NEURONTIN) 600 MG tablet, Take 1 tablet by mouth 3 times daily.  Multiple Vitamin (MULTIVITAMIN PO), Take  by mouth daily.    baclofen (LIORESAL) 20 MG tablet, Take 1 tablet by mouth 3 times daily  acetaminophen (TYLENOL) 500 MG tablet, Take 1 tablet by mouth as needed  oxybutynin (DITROPAN) 5 MG tablet, Take 1 tablet by mouth as needed For abdominal pain       Past Medical History:   Past Medical History:   Diagnosis Date    Asthma 2011    Hypertension 2011    Paralysis (HCC) 2009    lower extremity         Past Surgical History:   Past Surgical History:   Procedure

## 2024-08-07 NOTE — PROGRESS NOTES
Patient alert and oriented. VSS Patient. Pain meds given as scheduled. Self straight cath performed, 375 ml urine output noted. Patient Hemoglobin is 5.8 this AM , Dr Mehran Johnson notified. Patient in bed lowest position call light and bedside table within reach. All needs are met at this time. Patient aware to call if any help needed.Plan of care ongoing.   /87   Pulse 96   Temp 97.5 °F (36.4 °C) (Oral)   Resp 18   Ht 1.803 m (5' 11\")   Wt (!) 142.2 kg (313 lb 6.4 oz)   SpO2 97%   BMI 43.71 kg/m²

## 2024-08-07 NOTE — CONSULTS
Consult Note      Russell Livingston  1990    Consultant: Tommie  Reason for Consult:  anemia  Requesting Physician:  Maritza    CHIEF COMPLAINT:  anemia    History Obtained From:  patient, electronic medical record    HISTORY OF PRESENT ILLNESS:                The patient is a 33 y.o. male with significant past medical history of paraplegia who presents with anemia. Hgb in the 5s. No melena, hematochezia, abdominal pain, n/v, dysphagia. No NSAIDs. Does not eat trina/corn starch. No FHx of GI disease. Does not donate blood    Past Medical History:        Diagnosis Date    Asthma 5/20/2011    Hypertension 5/20/2011    Paralysis (HCC) 2009    lower extremity     Past Surgical History:        Procedure Laterality Date    SPINE SURGERY  2009    see overview.     Medications at Home:  Medications Prior to Admission: amLODIPine (NORVASC) 10 MG tablet, Take 1 tablet by mouth daily  bisacodyl (DULCOLAX) 10 MG suppository, Place 1 suppository rectally daily  nitrofurantoin, macrocrystal-monohydrate, (MACROBID) 100 MG capsule, Take 1 capsule by mouth 2 times daily  Cholecalciferol (VITAMIN D3) 1000 UNITS TABS, TAKE 2 TABLETS BY MOUTH DAILY.  tizanidine (ZANAFLEX) 4 MG tablet, Take 1 tablet by mouth every 8 hours as needed  gabapentin (NEURONTIN) 600 MG tablet, Take 1 tablet by mouth 3 times daily.  Multiple Vitamin (MULTIVITAMIN PO), Take  by mouth daily.    baclofen (LIORESAL) 20 MG tablet, Take 1 tablet by mouth 3 times daily  acetaminophen (TYLENOL) 500 MG tablet, Take 1 tablet by mouth as needed  oxybutynin (DITROPAN) 5 MG tablet, Take 1 tablet by mouth as needed For abdominal pain  Current Medications:    Current Facility-Administered Medications: cyanocobalamin injection 1,000 mcg, 1,000 mcg, IntraMUSCular, Daily  folic acid (FOLVITE) tablet 1 mg, 1 mg, Oral, Daily  epoetin stefan-epbx (RETACRIT) injection 10,000 Units, 10,000 Units, SubCUTAneous, Once per day on Mon Wed Fri  tiZANidine (ZANAFLEX) tablet 4 mg, 4 mg, Oral,

## 2024-08-07 NOTE — CONSENT
Informed Consent for Blood Component Transfusion Note    I have discussed with the patient the rationale for blood component transfusion; its benefits in treating or preventing fatigue, organ damage, or death; and its risk which includes mild transfusion reactions, rare risk of blood borne infection, or more serious but rare reactions. I have discussed the alternatives to transfusion, including the risk and consequences of not receiving transfusion. The patient had an opportunity to ask questions and had agreed to proceed with transfusion of blood components.    Electronically signed by Alex Laurent MD on 8/7/24 at 5:54 AM EDT

## 2024-08-07 NOTE — CARE COORDINATION
Case Management Assessment  Initial Evaluation    Date/Time of Evaluation: 8/7/2024 3:53 PM  Assessment Completed by: Annie Sethi RN    If patient is discharged prior to next notation, then this note serves as note for discharge by case management.    Patient Name: Russell Livingston                   YOB: 1990  Diagnosis: Palpitations [R00.2]  Symptomatic anemia [D64.9]  Acute anemia [D64.9]                   Date / Time: 8/6/2024  2:46 PM    Patient Admission Status: Inpatient   Readmission Risk (Low < 19, Mod (19-27), High > 27): Readmission Risk Score: 11.6    Current PCP: Unknown, Provider, APRN - NP  PCP verified by CM? Yes (he has a new PCP that he has not seen yet.)    Chart Reviewed: Yes      History Provided by: Patient  Patient Orientation: Alert and Oriented    Patient Cognition: Alert    Hospitalization in the last 30 days (Readmission):  No    If yes, Readmission Assessment in CM Navigator will be completed.    Advance Directives:      Code Status: Full Code   Patient's Primary Decision Maker is: Legal Next of Kin      Discharge Planning:    Patient lives with: Family Members, Other (Comment) (Mother) Type of Home: House  Primary Care Giver: Self  Patient Support Systems include: Family Members   Current Financial resources: Medicare  Current community resources: None  Current services prior to admission: Other (Comment)            Current DME:              Type of Home Care services:  None    ADLS  Prior functional level: Independent in ADLs/IADLs  Current functional level: Independent in ADLs/IADLs    PT AM-PAC:   /24  OT AM-PAC:   /24    Family can provide assistance at DC: Yes  Would you like Case Management to discuss the discharge plan with any other family members/significant others, and if so, who? No  Plans to Return to Present Housing: Yes  Other Identified Issues/Barriers to RETURNING to current housing: n/a  Potential Assistance needed at discharge: Other (Comment)

## 2024-08-07 NOTE — PROGRESS NOTES
Patient arrived to room 5305 from ED. Patient is A&O x 4 . VSS. Patient oriented to the room all safety measures in place. Patient given IS and SCDs at this time. Admission orders released and patient 4 eyes completed. Admission documentation completed. No other needs are noted at this time.    [x] Bed alarm on and cord plugged into wall  [x] Bed in lowest position  [x] Call light and bedside table within reach  [x] Patient educated on all safety measures  []Oxygen connected to wall (if applicable)     Nurse 1 Esignature: Electronically signed by Marjorie Trujillo RN on 8/7/24 at 8:14 AM EDT  Nurse 2 Esignature: {Esignature:[id number]}

## 2024-08-07 NOTE — PROGRESS NOTES
Pharmacy Note - Extended Infusion Beta-Lactam Adjustment    Piperacillin/Tazobactam 3375mg Q8h for treatment of Urinary tract infection. Per [facility] Extended Infusion Beta-Lactam Policy, piperacillin/tazobactam will be changed to 4500mg loading dose followed by 3375mg Q8h extended infusion    Estimated Creatinine Clearance: Estimated Creatinine Clearance: 115 mL/min (based on SCr of 1.3 mg/dL).    Dialysis Status, JER, CKD: n/a    BMI: Body mass index is 43.71 kg/m².    Rationale for Adjustment: Agent demonstrates time-dependent effect on bacterial eradication. Extended-infusion dosing strategy aims to enhance microbiologic and clinical efficacy.    Pharmacy will continue to monitor renal function, cultures and sensitivities (where available) and adjust dose as necessary.      Please call with any questions.    Thank you,    Brionna Hyman, PharmD  8/7/2024 12:56 PM

## 2024-08-07 NOTE — PROGRESS NOTES
Pt states he does not want to receive blood. Pt also states that he does not take lisinopril or hydrochlorothiazide for BP. MD made aware

## 2024-08-07 NOTE — PROGRESS NOTES
4 Eyes Skin Assessment     NAME:  Russell Livingston  YOB: 1990  MEDICAL RECORD NUMBER:  [id number]    The patient is being assessed for  Admission    I agree that at least one RN has performed a thorough Head to Toe Skin Assessment on the patient. ALL assessment sites listed below have been assessed.      Areas assessed by both nurses:    Head, Face, Ears, Shoulders, Back, Chest, Arms, Elbows, Hands, Sacrum. Buttock, Coccyx, Ischium, Legs. Feet and Heels, Under Medical Devices , and Other          Does the Patient have a Wound? No noted wound(s)       Jose Prevention initiated by RN: yes  Wound Care Orders initiated by RN: No    Pressure Injury (Stage 3,4, Unstageable, DTI, NWPT, and Complex wounds) if present, place Wound referral order by RN under : No    New Ostomies, if present place, Ostomy referral order under : No     Nurse 1 eSignature: Electronically signed by Marjorie Trujillo RN on 8/7/24 at 8:12 AM EDT    **SHARE this note so that the co-signing nurse can place an eSignature**    Nurse 2 eSignature: {Esignature:[id number]}

## 2024-08-08 LAB
HAPTOGLOB SERPL-MCNC: 217 MG/DL (ref 30–200)
HCT VFR BLD AUTO: 18.6 % (ref 40.5–52.5)
HEMOCCULT STL QL: ABNORMAL
HGB BLD-MCNC: 6.1 G/DL (ref 13.5–17.5)
IRON SATN MFR SERPL: 35 % (ref 20–50)
IRON SERPL-MCNC: 71 UG/DL (ref 59–158)
TIBC SERPL-MCNC: 201 UG/DL (ref 260–445)

## 2024-08-08 PROCEDURE — 2580000003 HC RX 258: Performed by: INTERNAL MEDICINE

## 2024-08-08 PROCEDURE — 1200000000 HC SEMI PRIVATE

## 2024-08-08 PROCEDURE — 6370000000 HC RX 637 (ALT 250 FOR IP): Performed by: NURSE PRACTITIONER

## 2024-08-08 PROCEDURE — 36415 COLL VENOUS BLD VENIPUNCTURE: CPT

## 2024-08-08 PROCEDURE — 6370000000 HC RX 637 (ALT 250 FOR IP): Performed by: INTERNAL MEDICINE

## 2024-08-08 PROCEDURE — 85018 HEMOGLOBIN: CPT

## 2024-08-08 PROCEDURE — 82270 OCCULT BLOOD FECES: CPT

## 2024-08-08 PROCEDURE — 6360000002 HC RX W HCPCS: Performed by: INTERNAL MEDICINE

## 2024-08-08 PROCEDURE — 85014 HEMATOCRIT: CPT

## 2024-08-08 PROCEDURE — 6360000002 HC RX W HCPCS: Performed by: NURSE PRACTITIONER

## 2024-08-08 RX ADMIN — SODIUM CHLORIDE, PRESERVATIVE FREE 10 ML: 5 INJECTION INTRAVENOUS at 10:09

## 2024-08-08 RX ADMIN — BACLOFEN 20 MG: 10 TABLET ORAL at 20:41

## 2024-08-08 RX ADMIN — GABAPENTIN 600 MG: 600 TABLET, FILM COATED ORAL at 20:41

## 2024-08-08 RX ADMIN — BACLOFEN 20 MG: 10 TABLET ORAL at 08:01

## 2024-08-08 RX ADMIN — PIPERACILLIN AND TAZOBACTAM 3375 MG: 3; .375 INJECTION, POWDER, LYOPHILIZED, FOR SOLUTION INTRAVENOUS at 21:39

## 2024-08-08 RX ADMIN — TIZANIDINE 4 MG: 4 TABLET ORAL at 15:36

## 2024-08-08 RX ADMIN — PIPERACILLIN AND TAZOBACTAM 3375 MG: 3; .375 INJECTION, POWDER, LYOPHILIZED, FOR SOLUTION INTRAVENOUS at 13:50

## 2024-08-08 RX ADMIN — AMLODIPINE BESYLATE 10 MG: 10 TABLET ORAL at 08:02

## 2024-08-08 RX ADMIN — GABAPENTIN 600 MG: 600 TABLET, FILM COATED ORAL at 15:36

## 2024-08-08 RX ADMIN — OXYBUTYNIN CHLORIDE 5 MG: 5 TABLET ORAL at 20:42

## 2024-08-08 RX ADMIN — OXYBUTYNIN CHLORIDE 5 MG: 5 TABLET ORAL at 08:02

## 2024-08-08 RX ADMIN — CYANOCOBALAMIN 1000 MCG: 1000 INJECTION, SOLUTION INTRAMUSCULAR; SUBCUTANEOUS at 12:43

## 2024-08-08 RX ADMIN — GABAPENTIN 600 MG: 600 TABLET, FILM COATED ORAL at 08:02

## 2024-08-08 RX ADMIN — TIZANIDINE 4 MG: 4 TABLET ORAL at 20:42

## 2024-08-08 RX ADMIN — OXYBUTYNIN CHLORIDE 5 MG: 5 TABLET ORAL at 15:36

## 2024-08-08 RX ADMIN — TIZANIDINE 4 MG: 4 TABLET ORAL at 08:02

## 2024-08-08 RX ADMIN — Medication 2000 UNITS: at 11:53

## 2024-08-08 RX ADMIN — BACLOFEN 20 MG: 10 TABLET ORAL at 15:36

## 2024-08-08 RX ADMIN — SODIUM CHLORIDE 200 MG: 9 INJECTION, SOLUTION INTRAVENOUS at 20:51

## 2024-08-08 RX ADMIN — FOLIC ACID 1 MG: 1 TABLET ORAL at 08:01

## 2024-08-08 RX ADMIN — BISACODYL 10 MG: 10 SUPPOSITORY RECTAL at 00:01

## 2024-08-08 RX ADMIN — PIPERACILLIN AND TAZOBACTAM 3375 MG: 3; .375 INJECTION, POWDER, LYOPHILIZED, FOR SOLUTION INTRAVENOUS at 06:39

## 2024-08-08 ASSESSMENT — PAIN DESCRIPTION - DESCRIPTORS
DESCRIPTORS: CRAMPING
DESCRIPTORS: CRAMPING

## 2024-08-08 ASSESSMENT — PAIN DESCRIPTION - LOCATION
LOCATION: LEG
LOCATION: LEG

## 2024-08-08 NOTE — CARE COORDINATION
Patient is from home and is wheelchair bound at baseline. He has denied needs for home care or DME but will need transport at d/c. He does have a ramped entrance. I was able to given information on transportation to the patient and his mother. Unfortunately insurance does not cover non-emergent transports and he does not qualify for services through [facility]. I did send a request to [employer] with his permission to see if they could provide any assistance.     Electronically signed by Annie Sethi RN on 8/8/2024 at 1:26 PM  [phone]

## 2024-08-08 NOTE — PROGRESS NOTES
ONCOLOGY HEMATOLOGY CARE PROGRESS NOTE      SUBJECTIVE:    He is feeling good today. Happy to hear that his Hgb improved. He was able to have a BM today and stool occult was sent. Admits that he was straight cathing himself poorly and likely caused trauma with his technique.     ROS:     Constitutional:  No weight loss, No fever, No chills, No night sweats.  Energy level good.  Eyes:  No impairment or change in vision  ENT / Mouth:  No pain, abnormal ulceration, bleeding, nasal drip or change in voice or hearing  Cardiovascular:  No chest pain, palpitations, new edema, or calf discomfort  Respiratory:  No pain, hemoptysis, change to breathing  Breast:  No pain, discharge, change in appearance or texture  Gastrointestinal:  No pain, cramping, jaundice, change to eating and bowel habits  Urinary:  No pain, bleeding or change in continence  Genitalia: No pain, bleeding or discharge  Musculoskeletal:  No redness, pain, edema or weakness  Skin:  No pruritus, rash, change to nodules or lesions  Neurologic:  No discomfort, change in mental status, speech, sensory or motor activity  Psychiatric:  No change in concentration or change to affect or mood  Endocrine:  No hot flashes, increased thirst, or change to urine production  Hematologic: No petechiae, ecchymosis or bleeding  Lymphatic:  No lymphadenopathy or lymphedema  Allergy / Immunologic:  No eczema, hives, frequent or recurrent infections    OBJECTIVE        Physical    VITALS:  Patient Vitals for the past 24 hrs:   BP Temp Temp src Pulse Resp SpO2   08/08/24 1051 (!) 155/83 98.5 °F (36.9 °C) Oral 100 18 98 %   08/08/24 0722 (!) 171/94 98.2 °F (36.8 °C) Oral (!) 101 20 97 %   08/08/24 0415 (!) 155/82 98.1 °F (36.7 °C) Oral 97 18 96 %   08/07/24 2325 (!) 142/79 98.4 °F (36.9 °C) Oral 98 18 96 %   08/07/24 1936 136/85 98.3 °F (36.8 °C) Oral 97 18 96 %   08/07/24 1502 (!) 154/85 98.5 °F (36.9 °C) Oral 99 18 96 %   08/07/24 1157 (!)

## 2024-08-08 NOTE — PROGRESS NOTES
Patient seen and examined  Doing ok no new issues or events  Feeling better states he is no longer having any issues with bleeding when he is urinating  States palpitations are better as well  Able to eat and drink without problems    Vitals:    08/08/24 1453   BP: (!) 168/99   Pulse: 100   Resp: 18   Temp: 98.4 °F (36.9 °C)   SpO2: 96%       Gen: pleasant alert oriented  Neck: no jvd  Chest; clear bilaterally  Cvs; s2s1 no murmurs   Abd: soft nd nt bs normal active  Ext: no edema positive pulses    Bun 20 creatinine 1.3    H/h today 6.1 and 18.6    32 yo male admitted with hematuria hemorrhagic cystitis, growing e coli.  Likely from instrumentation and self cath.  Patient states he is feeling much better today compared to yesterday, likely form abx.  Await final results of urine cultures, sensitivities to come back.  Anticipate discharge tomorrow discussed re checking blood work tomorrow patient does not want to, discussed endoscopy and colonoscopy as well at length gi following, discussed with mother at bedside as well.

## 2024-08-08 NOTE — PROGRESS NOTES
Patient alert and oriented. VSS Patient c/o constipation, Dulcolax suppository given. Straight cath performed x2  Assessment done.see flowsheet. Patient in bed lowest position call light and bedside table within reach. All needs are met at this time. Patient aware to call if any help needed.Plan of care ongoing.   BP (!) 155/82   Pulse 97   Temp 98.1 °F (36.7 °C) (Oral)   Resp 18   Ht 1.803 m (5' 11\")   Wt (!) 142.2 kg (313 lb 6.4 oz)   SpO2 96%   BMI 43.71 kg/m²

## 2024-08-08 NOTE — PROGRESS NOTES
Urology Attending Progress Note      Subjective: No  complaints. States his urine is clear. He is very happy because his hemoglobin improved today    Vitals:  BP (!) 171/94   Pulse (!) 101   Temp 98.2 °F (36.8 °C) (Oral)   Resp 20   Ht 1.803 m (5' 11\")   Wt (!) 142.2 kg (313 lb 6.4 oz)   SpO2 97%   BMI 43.71 kg/m²   Temp  Av.4 °F (36.9 °C)  Min: 98.1 °F (36.7 °C)  Max: 98.6 °F (37 °C)    Intake/Output Summary (Last 24 hours) at 2024 0916  Last data filed at 2024 0846  Gross per 24 hour   Intake 290 ml   Output 1850 ml   Net -1560 ml       Exam: Urine clear yellow     Labs:  WBC:    Lab Results   Component Value Date/Time    WBC 6.6 2024 04:48 AM     Hemoglobin/Hematocrit:    Lab Results   Component Value Date/Time    HGB 6.1 2024 05:53 AM    HCT 18.6 2024 05:53 AM     BMP:    Lab Results   Component Value Date/Time     2024 04:48 AM    K 3.9 2024 04:48 AM     2024 04:48 AM    CO2 24 2024 04:48 AM    BUN 20 2024 04:48 AM    CREATININE 1.3 2024 04:48 AM    CALCIUM 8.8 2024 04:48 AM    GFRAA >60 2018 02:56 PM    LABGLOM 73 2024 04:48 AM     PT/INR:  No results found for: \"PROTIME\", \"INR\"  PTT:  No results found for: \"APTT\"[APTT    Urine Culture:  E.coli    Antibiotic Therapy:  Zosyn    Imaging: IMPRESSION:     1.  No evidence of intra-abdominal hemorrhage or other acute abnormality abdomen and pelvis.  2.  Nonspecific bilateral external and common iliac lymphadenopathy which could be reactive or neoplastic. Recommend follow-up CT in 6 months to assess for stability.    Impression/Plan: 34yo M with neurogenic bladder from spinal cord injury dependent on ISC. Admitted with symptomatic anemia. We were consulted for intermittent gross hematuria.    -No  complaints today. His urine is clear yellow in the room today.  -CT without evidence of upper tract pathology  -Hgb improved to 6.1  -We recommend continued IV

## 2024-08-09 VITALS
SYSTOLIC BLOOD PRESSURE: 148 MMHG | HEART RATE: 100 BPM | WEIGHT: 313.4 LBS | RESPIRATION RATE: 18 BRPM | DIASTOLIC BLOOD PRESSURE: 92 MMHG | HEIGHT: 71 IN | TEMPERATURE: 98.6 F | BODY MASS INDEX: 43.88 KG/M2 | OXYGEN SATURATION: 95 %

## 2024-08-09 LAB
BACTERIA UR CULT: ABNORMAL
EPO SERPL-ACNC: 81 MU/ML (ref 4–27)
ORGANISM: ABNORMAL

## 2024-08-09 PROCEDURE — 2580000003 HC RX 258: Performed by: INTERNAL MEDICINE

## 2024-08-09 PROCEDURE — 6360000002 HC RX W HCPCS: Performed by: NURSE PRACTITIONER

## 2024-08-09 PROCEDURE — 6370000000 HC RX 637 (ALT 250 FOR IP): Performed by: NURSE PRACTITIONER

## 2024-08-09 PROCEDURE — 6370000000 HC RX 637 (ALT 250 FOR IP): Performed by: INTERNAL MEDICINE

## 2024-08-09 PROCEDURE — 6360000002 HC RX W HCPCS: Performed by: INTERNAL MEDICINE

## 2024-08-09 RX ORDER — FERROUS SULFATE 325(65) MG
325 TABLET, DELAYED RELEASE (ENTERIC COATED) ORAL 2 TIMES DAILY
Qty: 60 TABLET | Refills: 2 | Status: SHIPPED | OUTPATIENT
Start: 2024-08-09 | End: 2024-08-09

## 2024-08-09 RX ORDER — FERROUS SULFATE 325(65) MG
325 TABLET, DELAYED RELEASE (ENTERIC COATED) ORAL 2 TIMES DAILY
Qty: 60 TABLET | Refills: 2 | Status: SHIPPED | OUTPATIENT
Start: 2024-08-09

## 2024-08-09 RX ORDER — AMOXICILLIN AND CLAVULANATE POTASSIUM 875; 125 MG/1; MG/1
1 TABLET, FILM COATED ORAL 2 TIMES DAILY
Qty: 14 TABLET | Refills: 0 | Status: SHIPPED | OUTPATIENT
Start: 2024-08-09 | End: 2024-08-16

## 2024-08-09 RX ADMIN — BACLOFEN 20 MG: 10 TABLET ORAL at 07:18

## 2024-08-09 RX ADMIN — TIZANIDINE 4 MG: 4 TABLET ORAL at 07:18

## 2024-08-09 RX ADMIN — Medication 2000 UNITS: at 08:32

## 2024-08-09 RX ADMIN — AMLODIPINE BESYLATE 10 MG: 10 TABLET ORAL at 08:32

## 2024-08-09 RX ADMIN — SODIUM CHLORIDE, PRESERVATIVE FREE 10 ML: 5 INJECTION INTRAVENOUS at 08:33

## 2024-08-09 RX ADMIN — GABAPENTIN 600 MG: 600 TABLET, FILM COATED ORAL at 07:18

## 2024-08-09 RX ADMIN — CYANOCOBALAMIN 1000 MCG: 1000 INJECTION, SOLUTION INTRAMUSCULAR; SUBCUTANEOUS at 08:32

## 2024-08-09 RX ADMIN — OXYBUTYNIN CHLORIDE 5 MG: 5 TABLET ORAL at 07:18

## 2024-08-09 RX ADMIN — PIPERACILLIN AND TAZOBACTAM 3375 MG: 3; .375 INJECTION, POWDER, LYOPHILIZED, FOR SOLUTION INTRAVENOUS at 05:59

## 2024-08-09 RX ADMIN — FOLIC ACID 1 MG: 1 TABLET ORAL at 08:32

## 2024-08-09 NOTE — CARE COORDINATION
Case Management Assessment            Discharge Note                    Date / Time of Note: 8/9/2024 2:00 PM                  Discharge Note Completed by: Annie Sethi RN    Patient Name: Russell Livingston   YOB: 1990  Diagnosis: Palpitations [R00.2]  Symptomatic anemia [D64.9]  Acute anemia [D64.9]   Date / Time: 8/6/2024  2:46 PM    Current PCP: Unknown, Provider, APRN - NP  Clinic patient: No    Hospitalization in the last 30 days: No       Advance Directives:  Code Status: Full Code  [place] DNR form completed and on chart: No    Financial:  Payor: [facility] MEDICARE / Plan: [facility] MEDICARE ADVANTAGE / Product Type: *No Product type* /      Pharmacy:    [facility] - [facility] [address] - P [phone] - F [phone]  [address] [address]  Phone: [phone] Fax: [phone]    [id number] - [facility] [address] - P [phone] - F [phone]  [address] [address]  Phone: [phone] Fax: [phone]    [facility] [id number] [facility] [employer] - [place], [place] - [address] -  [phone] - F [phone]  [address] [address]  Phone: [phone] Fax: [phone]      Assistance purchasing medications?: Potential Assistance Purchasing Medications: No  Assistance provided by Case Management: None at this time    Does patient want to participate in local refill/ meds to beds program?: Yes    Meds To Beds General Rules:  1. Can ONLY be done Monday- Friday between 8:30am-5pm  2. Prescription(s) must be in pharmacy by 3pm to be filled same day  3.Copy of patient's insurance/ prescription drug card and patient face sheet must be sent along with the prescription(s)  4. Cost of Rx cannot be added to hospital bill. If financial assistance is needed, please contact unit  or ;  or  CANNOT provide pharmacy voucher for patients co-pays  5.

## 2024-08-09 NOTE — PROGRESS NOTES
ONCOLOGY HEMATOLOGY CARE PROGRESS NOTE      SUBJECTIVE:      ROS:     Constitutional:  No weight loss, No fever, No chills, No night sweats.  Energy level good.  Eyes:  No impairment or change in vision  ENT / Mouth:  No pain, abnormal ulceration, bleeding, nasal drip or change in voice or hearing  Cardiovascular:  No chest pain, palpitations, new edema, or calf discomfort  Respiratory:  No pain, hemoptysis, change to breathing  Breast:  No pain, discharge, change in appearance or texture  Gastrointestinal:  No pain, cramping, jaundice, change to eating and bowel habits  Urinary:  No pain, bleeding or change in continence  Genitalia: No pain, bleeding or discharge  Musculoskeletal:  No redness, pain, edema or weakness  Skin:  No pruritus, rash, change to nodules or lesions  Neurologic:  No discomfort, change in mental status, speech, sensory or motor activity  Psychiatric:  No change in concentration or change to affect or mood  Endocrine:  No hot flashes, increased thirst, or change to urine production  Hematologic: No petechiae, ecchymosis or bleeding  Lymphatic:  No lymphadenopathy or lymphedema  Allergy / Immunologic:  No eczema, hives, frequent or recurrent infections    OBJECTIVE        Physical    VITALS:  Patient Vitals for the past 24 hrs:   BP Temp Temp src Pulse Resp SpO2   08/09/24 0833 (!) 145/93 -- -- -- -- --   08/09/24 0829 -- 98.3 °F (36.8 °C) Oral 91 17 94 %   08/09/24 0336 (!) 143/86 98.1 °F (36.7 °C) Oral 78 17 94 %   08/08/24 2252 (!) 159/99 98.3 °F (36.8 °C) Oral 90 17 98 %   08/08/24 1948 (!) 143/87 98.1 °F (36.7 °C) Oral 88 17 93 %   08/08/24 1453 (!) 168/99 98.4 °F (36.9 °C) Oral 100 18 96 %   08/08/24 1051 (!) 155/83 98.5 °F (36.9 °C) Oral 100 18 98 %       24HR INTAKE/OUTPUT:    Intake/Output Summary (Last 24 hours) at 8/9/2024 1049  Last data filed at 8/9/2024 0831  Gross per 24 hour   Intake 240 ml   Output 1450 ml   Net -1210 ml  by Maurisio Kilgore  XR CHEST PORTABLE  Narrative: EXAM: PORTABLE CHEST X-RAY ON 8/6/2024 AT 1608 HOURS    INDICATION: Palpitations     COMPARISON: Portable chest 9/1/2018     FINDINGS:    LIMITATIONS:Portable exam    LINES/TUBES: None    HEART / MEDIASTINUM: The heart size is normal. The trachea is midline.    PLEURAL SPACES: The costophrenic angles are clear. No pneumothorax is seen.    LUNGS: There is no acute consolidation.    BONES / SOFT TISSUES: No significant abnormality.    OTHER: None.  Impression: No acute pulmonary pathology    Electronically signed by Giselle Avendaño MD      Problem List  Patient Active Problem List   Diagnosis    Asthma    Spinal injury    Hypertension    Sleep apnea    Symptomatic anemia    Paraplegia (HCC)       ASSESSMENT AND PLAN:    Anemia  Thrombocytosis  He is able to show me a [facility] CBC from 11/2021 which reveals Hgb 10.4 and normal iron studies   Hgb 6.5, platelets 716 on presentation   Hgb has dropped to 5.8 this morning, platelets have improved   He declines blood products due to Buddhism reasons      Checked iron studies, B12, folate, hemolysis labs, retic, epo, Copper, Peripheral Smear, stool occult   Ferritin is elevated--this is likely an acute phase reactant to his chronic infection   Continue Venofer   GI has been consulted--appreciate recommendations      Typical management of anemia in Episcopal patients includes:  Minimizing blood draws, Vitamin B12 injections, folic acid supplementation. We will order these today. Additionally will give Retacrit injection. Discussed side effects with the patient and his mother      He could also have some component of anemia of chronic disease secondary to his HTN. Cr at upper limits of normal      Hgb improved to 6.1 yesterday--no labs today  Thrombocytosis has improved   Continue to hold anticoagulation. I recommended he wear his compression stockings     ONCOLOGIC DISPOSITION:  He is discharging today. He does not

## 2024-08-09 NOTE — DISCHARGE SUMMARY
Hospital Medicine Discharge Summary    Patient: Russell Livingston     Gender: male  : 1990   Age: 33 y.o.  MRN: [id number]    Admitting Physician: Bernice Suazo MD  Discharge Physician: Schuyler Salas MD     Code Status: Full Code     Admit Date: 2024   Discharge Date:   2024    Disposition:  Home    Discharge Diagnoses:    Active Hospital Problems    Diagnosis Date Noted    Symptomatic anemia [D64.9] 2024    Paraplegia (HCC) [G82.20] 2024    Hypertension [I10] 2011       Follow-up appointments:  one week    Outpatient to do list: f/u with urology and pcp     Condition at Discharge:  Stable    Hospital Course:   Very pleasant 33 year old male who was admitted with anemia.  The patient has a hx of paraplegia, he does self catheterization daily.  The patient states he had been having blood in urine for quite some time, months, maybe a couple years.  The patient has cta abd/pelvis which did not reveal signs of blood loss.  The patient is jehovahs witness and did not want blood transfusion.  The patient was started on zosyn IV, he grew e coli.  The patient does not tolerate antibiotics well, he states that the zosyn he did fine on. He had improvement in urinary symptoms during his stay, the patient was discharged in stable condition on .  The patient did not want further blood draws since he was not bleeding.  I have discussed side effects of medications at length with patient and mother at bedside, and asked that he monitor for those, recurrence of symptoms or new symptoms including but not limited to chest pain shortness of breath nausea vomiting fevers or chills and seek immediate medical attention or call 911.      Discharge Medications:   Current Discharge Medication List        START taking these medications    Details   amoxicillin-clavulanate (AUGMENTIN) 875-125 MG per tablet Take 1 tablet by mouth 2 times daily for 7 days  Qty: 14 tablet, Refills: 0      ferrous  no rales or rhonchi, no use of accessory musclesNormal respiratory effort. Clear to auscultation, bilaterally without Rales/Wheezes/Rhonchi.  Abdomen: Soft, non-tender, non-distended, bowel sounds present, no masses  Musculoskeletal:  No clubbing, no cyanosis, no edema  Skin: No lesion or masses      Labs: For convenience and continuity at follow-up the following most recent labs are provided:    Lab Results   Component Value Date/Time    WBC 6.6 08/07/2024 04:48 AM    HGB 6.1 08/08/2024 05:53 AM    HCT 18.6 08/08/2024 05:53 AM    MCV 85.8 08/07/2024 04:48 AM     08/07/2024 04:48 AM     08/07/2024 04:48 AM    K 3.9 08/07/2024 04:48 AM     08/07/2024 04:48 AM    CO2 24 08/07/2024 04:48 AM    BUN 20 08/07/2024 04:48 AM    CREATININE 1.3 08/07/2024 04:48 AM    CALCIUM 8.8 08/07/2024 04:48 AM     No results found for: \"INR\"    Radiology:  CT ABDOMEN PELVIS W IV CONTRAST Additional Contrast? None    Result Date: 8/6/2024  EXAM: CT ABDOMEN AND PELVIS WITH CONTRAST INDICATION: Unexplained acute anemia COMPARISON: None. TECHNIQUE: Axial CT imaging obtained from lung bases through pelvis. Axial images and multiplanar reformatted images are provided for review.   Individualized dose optimization technique was used in order to meet ALARA standards for radiation dose reduction.  In addition to vendor specific dose reduction algorithms, the dose reduction techniques vary based on the specific scanner utilized but frequently include automated exposure control, adjustment of the mA and/or kV according to patient size, and use of iterative reconstruction technique. IV Contrast: 75 mL of Isovue 370 Oral Contrast: None. FINDINGS: LUNG BASES: Clear. LIVER: Normal. GALLBLADDER AND BILIARY TREE: No calcified gallstones. No gallbladder distention.  No intra- or extrahepatic biliary dilatation. PANCREAS: Normal. SPLEEN: Normal. ADRENAL GLANDS: Normal. KIDNEYS AND URETERS: No hydronephrosis. Normal enhancement. No

## 2024-08-09 NOTE — PLAN OF CARE
Problem: Pain  Goal: Verbalizes/displays adequate comfort level or baseline comfort level  8/9/2024 1024 by Garima Bai, RN  Flowsheets (Taken 8/9/2024 1024)  Verbalizes/displays adequate comfort level or baseline comfort level:   Encourage patient to monitor pain and request assistance   Assess pain using appropriate pain scale

## 2024-08-09 NOTE — PLAN OF CARE
Problem: Skin/Tissue Integrity  Goal: Absence of new skin breakdown  Description: 1.  Monitor for areas of redness and/or skin breakdown  2.  Assess vascular access sites hourly  3.  Every 4-6 hours minimum:  Change oxygen saturation probe site  4.  Every 4-6 hours:  If on nasal continuous positive airway pressure, respiratory therapy assess nares and determine need for appliance change or resting period.  Outcome: Progressing     Problem: Discharge Planning  Goal: Discharge to home or other facility with appropriate resources  Outcome: Progressing     Problem: Safety - Adult  Goal: Free from fall injury  Outcome: Progressing

## 2024-09-04 ENCOUNTER — APPOINTMENT (OUTPATIENT)
Dept: GENERAL RADIOLOGY | Age: 34
End: 2024-09-04
Payer: MEDICARE

## 2024-09-04 ENCOUNTER — HOSPITAL ENCOUNTER (INPATIENT)
Age: 34
LOS: 1 days | Discharge: HOME | End: 2024-09-06
Attending: EMERGENCY MEDICINE | Admitting: INTERNAL MEDICINE
Payer: MEDICARE

## 2024-09-04 DIAGNOSIS — A41.9 SEPTICEMIA (HCC): ICD-10-CM

## 2024-09-04 DIAGNOSIS — N30.00 ACUTE CYSTITIS WITHOUT HEMATURIA: Primary | ICD-10-CM

## 2024-09-04 LAB
ALBUMIN SERPL-MCNC: 3.6 G/DL (ref 3.4–5)
ALP SERPL-CCNC: 82 U/L (ref 40–129)
ALT SERPL-CCNC: 11 U/L (ref 10–40)
ANION GAP SERPL CALCULATED.3IONS-SCNC: 13 MMOL/L (ref 3–16)
AST SERPL-CCNC: 21 U/L (ref 15–37)
BACTERIA URNS QL MICRO: ABNORMAL /HPF
BASE EXCESS BLDV CALC-SCNC: 2.3 MMOL/L (ref -2–3)
BASOPHILS # BLD: 0.1 K/UL (ref 0–0.2)
BASOPHILS NFR BLD: 0.4 %
BILIRUB DIRECT SERPL-MCNC: 0.2 MG/DL (ref 0–0.3)
BILIRUB INDIRECT SERPL-MCNC: 0.2 MG/DL (ref 0–1)
BILIRUB SERPL-MCNC: 0.4 MG/DL (ref 0–1)
BILIRUB UR QL STRIP.AUTO: NEGATIVE
BUN SERPL-MCNC: 25 MG/DL (ref 7–20)
CALCIUM SERPL-MCNC: 8.8 MG/DL (ref 8.3–10.6)
CHLORIDE SERPL-SCNC: 98 MMOL/L (ref 99–110)
CLARITY UR: CLEAR
CO2 BLDV-SCNC: 29 MMOL/L
CO2 SERPL-SCNC: 23 MMOL/L (ref 21–32)
COHGB MFR BLDV: 1.5 % (ref 0–1.5)
COLOR UR: YELLOW
CREAT SERPL-MCNC: 1.9 MG/DL (ref 0.9–1.3)
DEPRECATED RDW RBC AUTO: 16.1 % (ref 12.4–15.4)
DO-HGB MFR BLDV: 54.4 %
EOSINOPHIL # BLD: 0 K/UL (ref 0–0.6)
EOSINOPHIL NFR BLD: 0.3 %
EPI CELLS #/AREA URNS HPF: ABNORMAL /HPF (ref 0–5)
GFR SERPLBLD CREATININE-BSD FMLA CKD-EPI: 46 ML/MIN/{1.73_M2}
GLUCOSE SERPL-MCNC: 121 MG/DL (ref 70–99)
GLUCOSE UR STRIP.AUTO-MCNC: NEGATIVE MG/DL
HCO3 BLDV-SCNC: 27.6 MMOL/L (ref 24–28)
HCT VFR BLD AUTO: 34.2 % (ref 40.5–52.5)
HGB BLD-MCNC: 10.6 G/DL (ref 13.5–17.5)
HGB UR QL STRIP.AUTO: ABNORMAL
HYALINE CASTS #/AREA URNS LPF: ABNORMAL /LPF (ref 0–2)
KETONES UR STRIP.AUTO-MCNC: NEGATIVE MG/DL
LACTATE BLDV-SCNC: 0.9 MMOL/L (ref 0.4–1.9)
LEUKOCYTE ESTERASE UR QL STRIP.AUTO: ABNORMAL
LYMPHOCYTES # BLD: 1.7 K/UL (ref 1–5.1)
LYMPHOCYTES NFR BLD: 10.6 %
MCH RBC QN AUTO: 26.2 PG (ref 26–34)
MCHC RBC AUTO-ENTMCNC: 31.1 G/DL (ref 31–36)
MCV RBC AUTO: 84.3 FL (ref 80–100)
METHGB MFR BLDV: 0.3 % (ref 0–1.5)
MONOCYTES # BLD: 1 K/UL (ref 0–1.3)
MONOCYTES NFR BLD: 5.9 %
NEUTROPHILS # BLD: 13.3 K/UL (ref 1.7–7.7)
NEUTROPHILS NFR BLD: 82.8 %
NITRITE UR QL STRIP.AUTO: NEGATIVE
PCO2 BLDV: 45 MMHG (ref 41–51)
PH BLDV: 7.4 [PH] (ref 7.35–7.45)
PH UR STRIP.AUTO: 6 [PH] (ref 5–8)
PLATELET # BLD AUTO: 414 K/UL (ref 135–450)
PMV BLD AUTO: 7.1 FL (ref 5–10.5)
PO2 BLDV: <30 MMHG (ref 25–40)
POTASSIUM SERPL-SCNC: 4.3 MMOL/L (ref 3.5–5.1)
PROCALCITONIN SERPL IA-MCNC: 0.27 NG/ML (ref 0–0.15)
PROT SERPL-MCNC: 8.2 G/DL (ref 6.4–8.2)
PROT UR STRIP.AUTO-MCNC: >=300 MG/DL
RBC # BLD AUTO: 4.06 M/UL (ref 4.2–5.9)
RBC #/AREA URNS HPF: ABNORMAL /HPF (ref 0–4)
SAO2 % BLDV: 45 %
SODIUM SERPL-SCNC: 134 MMOL/L (ref 136–145)
SP GR UR STRIP.AUTO: >=1.03 (ref 1–1.03)
UA COMPLETE W REFLEX CULTURE PNL UR: YES
UA DIPSTICK W REFLEX MICRO PNL UR: YES
URN SPEC COLLECT METH UR: ABNORMAL
UROBILINOGEN UR STRIP-ACNC: 0.2 E.U./DL
WBC # BLD AUTO: 16 K/UL (ref 4–11)
WBC #/AREA URNS HPF: ABNORMAL /HPF (ref 0–5)

## 2024-09-04 PROCEDURE — 71046 X-RAY EXAM CHEST 2 VIEWS: CPT

## 2024-09-04 PROCEDURE — 87186 SC STD MICRODIL/AGAR DIL: CPT

## 2024-09-04 PROCEDURE — 82803 BLOOD GASES ANY COMBINATION: CPT

## 2024-09-04 PROCEDURE — 96374 THER/PROPH/DIAG INJ IV PUSH: CPT

## 2024-09-04 PROCEDURE — 36415 COLL VENOUS BLD VENIPUNCTURE: CPT

## 2024-09-04 PROCEDURE — 99285 EMERGENCY DEPT VISIT HI MDM: CPT

## 2024-09-04 PROCEDURE — 81001 URINALYSIS AUTO W/SCOPE: CPT

## 2024-09-04 PROCEDURE — 85025 COMPLETE CBC W/AUTO DIFF WBC: CPT

## 2024-09-04 PROCEDURE — 80076 HEPATIC FUNCTION PANEL: CPT

## 2024-09-04 PROCEDURE — 93005 ELECTROCARDIOGRAM TRACING: CPT | Performed by: EMERGENCY MEDICINE

## 2024-09-04 PROCEDURE — 6360000002 HC RX W HCPCS: Performed by: EMERGENCY MEDICINE

## 2024-09-04 PROCEDURE — 80048 BASIC METABOLIC PNL TOTAL CA: CPT

## 2024-09-04 PROCEDURE — 84145 PROCALCITONIN (PCT): CPT

## 2024-09-04 PROCEDURE — 2580000003 HC RX 258: Performed by: EMERGENCY MEDICINE

## 2024-09-04 PROCEDURE — 87088 URINE BACTERIA CULTURE: CPT

## 2024-09-04 PROCEDURE — 83605 ASSAY OF LACTIC ACID: CPT

## 2024-09-04 PROCEDURE — 87086 URINE CULTURE/COLONY COUNT: CPT

## 2024-09-04 PROCEDURE — 87040 BLOOD CULTURE FOR BACTERIA: CPT

## 2024-09-04 PROCEDURE — 96361 HYDRATE IV INFUSION ADD-ON: CPT

## 2024-09-04 RX ORDER — LORAZEPAM 2 MG/ML
0.5 INJECTION INTRAMUSCULAR ONCE
Status: DISCONTINUED | OUTPATIENT
Start: 2024-09-04 | End: 2024-09-04

## 2024-09-04 RX ORDER — SODIUM CHLORIDE, SODIUM LACTATE, POTASSIUM CHLORIDE, AND CALCIUM CHLORIDE .6; .31; .03; .02 G/100ML; G/100ML; G/100ML; G/100ML
1000 INJECTION, SOLUTION INTRAVENOUS ONCE
Status: COMPLETED | OUTPATIENT
Start: 2024-09-04 | End: 2024-09-04

## 2024-09-04 RX ORDER — LORAZEPAM 2 MG/ML
0.5 INJECTION INTRAMUSCULAR ONCE
Status: COMPLETED | OUTPATIENT
Start: 2024-09-04 | End: 2024-09-04

## 2024-09-04 RX ORDER — SODIUM CHLORIDE, SODIUM LACTATE, POTASSIUM CHLORIDE, AND CALCIUM CHLORIDE .6; .31; .03; .02 G/100ML; G/100ML; G/100ML; G/100ML
1000 INJECTION, SOLUTION INTRAVENOUS ONCE
Status: COMPLETED | OUTPATIENT
Start: 2024-09-04 | End: 2024-09-05

## 2024-09-04 RX ORDER — LANOLIN ALCOHOL/MO/W.PET/CERES
400 CREAM (GRAM) TOPICAL DAILY
COMMUNITY

## 2024-09-04 RX ORDER — UBIDECARENONE 75 MG
50 CAPSULE ORAL DAILY
COMMUNITY

## 2024-09-04 RX ADMIN — LORAZEPAM 0.5 MG: 2 INJECTION INTRAMUSCULAR; INTRAVENOUS at 20:57

## 2024-09-04 RX ADMIN — SODIUM CHLORIDE, POTASSIUM CHLORIDE, SODIUM LACTATE AND CALCIUM CHLORIDE 1000 ML: 600; 310; 30; 20 INJECTION, SOLUTION INTRAVENOUS at 20:55

## 2024-09-04 RX ADMIN — SODIUM CHLORIDE, POTASSIUM CHLORIDE, SODIUM LACTATE AND CALCIUM CHLORIDE 1000 ML: 600; 310; 30; 20 INJECTION, SOLUTION INTRAVENOUS at 23:39

## 2024-09-04 ASSESSMENT — PAIN DESCRIPTION - DESCRIPTORS: DESCRIPTORS: SORE

## 2024-09-04 ASSESSMENT — LIFESTYLE VARIABLES
HOW OFTEN DO YOU HAVE A DRINK CONTAINING ALCOHOL: NEVER
HOW MANY STANDARD DRINKS CONTAINING ALCOHOL DO YOU HAVE ON A TYPICAL DAY: PATIENT DOES NOT DRINK

## 2024-09-04 ASSESSMENT — PAIN - FUNCTIONAL ASSESSMENT: PAIN_FUNCTIONAL_ASSESSMENT: 0-10

## 2024-09-04 ASSESSMENT — PAIN DESCRIPTION - LOCATION: LOCATION: SHOULDER

## 2024-09-04 ASSESSMENT — PAIN SCALES - GENERAL: PAINLEVEL_OUTOF10: 6

## 2024-09-04 ASSESSMENT — PAIN DESCRIPTION - ORIENTATION: ORIENTATION: LEFT

## 2024-09-04 NOTE — ED TRIAGE NOTES
Pt coming in for having anxiety for 6 months now due to insurance problem but called 911 today since he started having jitters on his hands and mouth with anxiety and he's concerned it is related with his low hemoglobin cause he had symptoms like this when his hgb was just 5.8 and needed iron transfusion.

## 2024-09-05 PROBLEM — N39.0 UTI (URINARY TRACT INFECTION): Status: ACTIVE | Noted: 2024-09-05

## 2024-09-05 LAB
ANION GAP SERPL CALCULATED.3IONS-SCNC: 12 MMOL/L (ref 3–16)
BASOPHILS # BLD: 0.1 K/UL (ref 0–0.2)
BASOPHILS NFR BLD: 0.7 %
BUN SERPL-MCNC: 27 MG/DL (ref 7–20)
CALCIUM SERPL-MCNC: 8.4 MG/DL (ref 8.3–10.6)
CHLORIDE SERPL-SCNC: 102 MMOL/L (ref 99–110)
CO2 SERPL-SCNC: 22 MMOL/L (ref 21–32)
CREAT SERPL-MCNC: 2.2 MG/DL (ref 0.9–1.3)
DEPRECATED RDW RBC AUTO: 16.1 % (ref 12.4–15.4)
EKG ATRIAL RATE: 133 BPM
EKG DIAGNOSIS: NORMAL
EKG P AXIS: 56 DEGREES
EKG P-R INTERVAL: 134 MS
EKG Q-T INTERVAL: 278 MS
EKG QRS DURATION: 80 MS
EKG QTC CALCULATION (BAZETT): 413 MS
EKG R AXIS: 89 DEGREES
EKG T AXIS: 36 DEGREES
EKG VENTRICULAR RATE: 133 BPM
EOSINOPHIL # BLD: 0.1 K/UL (ref 0–0.6)
EOSINOPHIL NFR BLD: 0.5 %
GFR SERPLBLD CREATININE-BSD FMLA CKD-EPI: 39 ML/MIN/{1.73_M2}
GLUCOSE SERPL-MCNC: 129 MG/DL (ref 70–99)
HCT VFR BLD AUTO: 31.5 % (ref 40.5–52.5)
HGB BLD-MCNC: 9.9 G/DL (ref 13.5–17.5)
LACTATE BLDV-SCNC: 0.6 MMOL/L (ref 0.4–2)
LYMPHOCYTES # BLD: 3.1 K/UL (ref 1–5.1)
LYMPHOCYTES NFR BLD: 20.1 %
MCH RBC QN AUTO: 26.7 PG (ref 26–34)
MCHC RBC AUTO-ENTMCNC: 31.4 G/DL (ref 31–36)
MCV RBC AUTO: 85 FL (ref 80–100)
MONOCYTES # BLD: 1.2 K/UL (ref 0–1.3)
MONOCYTES NFR BLD: 7.5 %
NEUTROPHILS # BLD: 10.9 K/UL (ref 1.7–7.7)
NEUTROPHILS NFR BLD: 71.2 %
PHOSPHATE SERPL-MCNC: 4.2 MG/DL (ref 2.5–4.9)
PLATELET # BLD AUTO: 368 K/UL (ref 135–450)
PMV BLD AUTO: 7.2 FL (ref 5–10.5)
POTASSIUM SERPL-SCNC: 4.2 MMOL/L (ref 3.5–5.1)
RBC # BLD AUTO: 3.71 M/UL (ref 4.2–5.9)
SODIUM SERPL-SCNC: 136 MMOL/L (ref 136–145)
WBC # BLD AUTO: 15.3 K/UL (ref 4–11)

## 2024-09-05 PROCEDURE — 6370000000 HC RX 637 (ALT 250 FOR IP): Performed by: INTERNAL MEDICINE

## 2024-09-05 PROCEDURE — 1200000000 HC SEMI PRIVATE

## 2024-09-05 PROCEDURE — 83605 ASSAY OF LACTIC ACID: CPT

## 2024-09-05 PROCEDURE — 36415 COLL VENOUS BLD VENIPUNCTURE: CPT

## 2024-09-05 PROCEDURE — 2580000003 HC RX 258: Performed by: INTERNAL MEDICINE

## 2024-09-05 PROCEDURE — 2580000003 HC RX 258: Performed by: EMERGENCY MEDICINE

## 2024-09-05 PROCEDURE — 84100 ASSAY OF PHOSPHORUS: CPT

## 2024-09-05 PROCEDURE — 85025 COMPLETE CBC W/AUTO DIFF WBC: CPT

## 2024-09-05 PROCEDURE — 6360000002 HC RX W HCPCS: Performed by: NURSE PRACTITIONER

## 2024-09-05 PROCEDURE — 2580000003 HC RX 258: Performed by: NURSE PRACTITIONER

## 2024-09-05 PROCEDURE — 6360000002 HC RX W HCPCS: Performed by: INTERNAL MEDICINE

## 2024-09-05 PROCEDURE — 80048 BASIC METABOLIC PNL TOTAL CA: CPT

## 2024-09-05 PROCEDURE — 6370000000 HC RX 637 (ALT 250 FOR IP): Performed by: NURSE PRACTITIONER

## 2024-09-05 RX ORDER — OXYBUTYNIN CHLORIDE 5 MG/1
5 TABLET ORAL PRN
Status: DISCONTINUED | OUTPATIENT
Start: 2024-09-05 | End: 2024-09-06 | Stop reason: HOSPADM

## 2024-09-05 RX ORDER — LANOLIN ALCOHOL/MO/W.PET/CERES
400 CREAM (GRAM) TOPICAL DAILY
Status: DISCONTINUED | OUTPATIENT
Start: 2024-09-05 | End: 2024-09-06 | Stop reason: HOSPADM

## 2024-09-05 RX ORDER — POTASSIUM CHLORIDE 7.45 MG/ML
10 INJECTION INTRAVENOUS PRN
Status: DISCONTINUED | OUTPATIENT
Start: 2024-09-05 | End: 2024-09-06 | Stop reason: HOSPADM

## 2024-09-05 RX ORDER — BISACODYL 10 MG
10 SUPPOSITORY, RECTAL RECTAL DAILY
Status: DISCONTINUED | OUTPATIENT
Start: 2024-09-05 | End: 2024-09-06 | Stop reason: HOSPADM

## 2024-09-05 RX ORDER — SODIUM CHLORIDE 9 MG/ML
INJECTION, SOLUTION INTRAVENOUS PRN
Status: DISCONTINUED | OUTPATIENT
Start: 2024-09-05 | End: 2024-09-06 | Stop reason: HOSPADM

## 2024-09-05 RX ORDER — LIDOCAINE 4 G/G
1 PATCH TOPICAL DAILY
Status: DISCONTINUED | OUTPATIENT
Start: 2024-09-05 | End: 2024-09-06 | Stop reason: HOSPADM

## 2024-09-05 RX ORDER — BACLOFEN 10 MG/1
20 TABLET ORAL 3 TIMES DAILY
Status: DISCONTINUED | OUTPATIENT
Start: 2024-09-05 | End: 2024-09-06 | Stop reason: HOSPADM

## 2024-09-05 RX ORDER — SODIUM CHLORIDE 0.9 % (FLUSH) 0.9 %
5-40 SYRINGE (ML) INJECTION PRN
Status: DISCONTINUED | OUTPATIENT
Start: 2024-09-05 | End: 2024-09-06 | Stop reason: HOSPADM

## 2024-09-05 RX ORDER — AMLODIPINE BESYLATE 10 MG/1
10 TABLET ORAL DAILY
Status: DISCONTINUED | OUTPATIENT
Start: 2024-09-05 | End: 2024-09-06 | Stop reason: HOSPADM

## 2024-09-05 RX ORDER — ACETAMINOPHEN 325 MG/1
650 TABLET ORAL EVERY 6 HOURS PRN
Status: DISCONTINUED | OUTPATIENT
Start: 2024-09-05 | End: 2024-09-06 | Stop reason: HOSPADM

## 2024-09-05 RX ORDER — ENOXAPARIN SODIUM 100 MG/ML
30 INJECTION SUBCUTANEOUS 2 TIMES DAILY
Status: DISCONTINUED | OUTPATIENT
Start: 2024-09-05 | End: 2024-09-06 | Stop reason: HOSPADM

## 2024-09-05 RX ORDER — VITAMIN B COMPLEX
2 TABLET ORAL DAILY
Status: DISCONTINUED | OUTPATIENT
Start: 2024-09-05 | End: 2024-09-06 | Stop reason: HOSPADM

## 2024-09-05 RX ORDER — GABAPENTIN 600 MG/1
600 TABLET ORAL 3 TIMES DAILY
Status: DISCONTINUED | OUTPATIENT
Start: 2024-09-05 | End: 2024-09-06 | Stop reason: HOSPADM

## 2024-09-05 RX ORDER — POTASSIUM CHLORIDE 1500 MG/1
40 TABLET, EXTENDED RELEASE ORAL PRN
Status: DISCONTINUED | OUTPATIENT
Start: 2024-09-05 | End: 2024-09-06 | Stop reason: HOSPADM

## 2024-09-05 RX ORDER — POLYETHYLENE GLYCOL 3350 17 G/17G
17 POWDER, FOR SOLUTION ORAL DAILY PRN
Status: DISCONTINUED | OUTPATIENT
Start: 2024-09-05 | End: 2024-09-06 | Stop reason: HOSPADM

## 2024-09-05 RX ORDER — UBIDECARENONE 75 MG
50 CAPSULE ORAL DAILY
Status: DISCONTINUED | OUTPATIENT
Start: 2024-09-05 | End: 2024-09-06 | Stop reason: HOSPADM

## 2024-09-05 RX ORDER — LIDOCAINE 4 G/G
1 PATCH TOPICAL DAILY
Status: DISCONTINUED | OUTPATIENT
Start: 2024-09-06 | End: 2024-09-05

## 2024-09-05 RX ORDER — FERROUS SULFATE 325(65) MG
325 TABLET ORAL 2 TIMES DAILY
Status: DISCONTINUED | OUTPATIENT
Start: 2024-09-05 | End: 2024-09-06 | Stop reason: HOSPADM

## 2024-09-05 RX ORDER — ONDANSETRON 4 MG/1
4 TABLET, ORALLY DISINTEGRATING ORAL EVERY 8 HOURS PRN
Status: DISCONTINUED | OUTPATIENT
Start: 2024-09-05 | End: 2024-09-06 | Stop reason: HOSPADM

## 2024-09-05 RX ORDER — ONDANSETRON 2 MG/ML
4 INJECTION INTRAMUSCULAR; INTRAVENOUS EVERY 6 HOURS PRN
Status: DISCONTINUED | OUTPATIENT
Start: 2024-09-05 | End: 2024-09-06 | Stop reason: HOSPADM

## 2024-09-05 RX ORDER — MAGNESIUM SULFATE IN WATER 40 MG/ML
2000 INJECTION, SOLUTION INTRAVENOUS PRN
Status: DISCONTINUED | OUTPATIENT
Start: 2024-09-05 | End: 2024-09-06 | Stop reason: HOSPADM

## 2024-09-05 RX ORDER — ACETAMINOPHEN 650 MG/1
650 SUPPOSITORY RECTAL EVERY 6 HOURS PRN
Status: DISCONTINUED | OUTPATIENT
Start: 2024-09-05 | End: 2024-09-06 | Stop reason: HOSPADM

## 2024-09-05 RX ORDER — SODIUM CHLORIDE 9 MG/ML
INJECTION, SOLUTION INTRAVENOUS CONTINUOUS
Status: DISCONTINUED | OUTPATIENT
Start: 2024-09-05 | End: 2024-09-06 | Stop reason: HOSPADM

## 2024-09-05 RX ORDER — SODIUM CHLORIDE 0.9 % (FLUSH) 0.9 %
5-40 SYRINGE (ML) INJECTION EVERY 12 HOURS SCHEDULED
Status: DISCONTINUED | OUTPATIENT
Start: 2024-09-05 | End: 2024-09-06 | Stop reason: HOSPADM

## 2024-09-05 RX ADMIN — BACLOFEN 20 MG: 10 TABLET ORAL at 13:47

## 2024-09-05 RX ADMIN — BISACODYL 10 MG: 10 SUPPOSITORY RECTAL at 21:53

## 2024-09-05 RX ADMIN — OXYBUTYNIN CHLORIDE 5 MG: 5 TABLET ORAL at 18:07

## 2024-09-05 RX ADMIN — OXYBUTYNIN CHLORIDE 5 MG: 5 TABLET ORAL at 09:57

## 2024-09-05 RX ADMIN — GABAPENTIN 600 MG: 600 TABLET, FILM COATED ORAL at 21:53

## 2024-09-05 RX ADMIN — TIZANIDINE 4 MG: 4 TABLET ORAL at 09:57

## 2024-09-05 RX ADMIN — TIZANIDINE 4 MG: 4 TABLET ORAL at 18:07

## 2024-09-05 RX ADMIN — GABAPENTIN 600 MG: 600 TABLET, FILM COATED ORAL at 08:23

## 2024-09-05 RX ADMIN — Medication 2000 UNITS: at 08:22

## 2024-09-05 RX ADMIN — WATER 2000 MG: 1 INJECTION INTRAMUSCULAR; INTRAVENOUS; SUBCUTANEOUS at 02:54

## 2024-09-05 RX ADMIN — SODIUM CHLORIDE: 9 INJECTION, SOLUTION INTRAVENOUS at 02:05

## 2024-09-05 RX ADMIN — BACLOFEN 20 MG: 10 TABLET ORAL at 08:23

## 2024-09-05 RX ADMIN — VITAM B12 50 MCG: 100 TAB at 08:23

## 2024-09-05 RX ADMIN — ACETAMINOPHEN 650 MG: 325 TABLET ORAL at 02:06

## 2024-09-05 RX ADMIN — GABAPENTIN 600 MG: 600 TABLET, FILM COATED ORAL at 13:47

## 2024-09-05 RX ADMIN — SODIUM CHLORIDE, PRESERVATIVE FREE 10 ML: 5 INJECTION INTRAVENOUS at 21:55

## 2024-09-05 RX ADMIN — FERROUS SULFATE TAB 325 MG (65 MG ELEMENTAL FE) 325 MG: 325 (65 FE) TAB at 08:23

## 2024-09-05 RX ADMIN — BACLOFEN 20 MG: 10 TABLET ORAL at 21:53

## 2024-09-05 RX ADMIN — AMLODIPINE BESYLATE 10 MG: 10 TABLET ORAL at 08:23

## 2024-09-05 RX ADMIN — SODIUM CHLORIDE, POTASSIUM CHLORIDE, SODIUM LACTATE AND CALCIUM CHLORIDE 1000 ML: 600; 310; 30; 20 INJECTION, SOLUTION INTRAVENOUS at 00:02

## 2024-09-05 RX ADMIN — FOLIC ACID TAB 400 MCG 400 MCG: 400 TAB at 08:23

## 2024-09-05 RX ADMIN — WATER 1000 MG: 1 INJECTION INTRAMUSCULAR; INTRAVENOUS; SUBCUTANEOUS at 23:58

## 2024-09-05 ASSESSMENT — PAIN - FUNCTIONAL ASSESSMENT: PAIN_FUNCTIONAL_ASSESSMENT: ACTIVITIES ARE NOT PREVENTED

## 2024-09-05 ASSESSMENT — PAIN DESCRIPTION - LOCATION: LOCATION: NECK

## 2024-09-05 ASSESSMENT — PAIN SCALES - GENERAL
PAINLEVEL_OUTOF10: 0
PAINLEVEL_OUTOF10: 4

## 2024-09-05 ASSESSMENT — PAIN DESCRIPTION - DESCRIPTORS: DESCRIPTORS: ACHING

## 2024-09-05 ASSESSMENT — PAIN DESCRIPTION - ORIENTATION: ORIENTATION: LEFT

## 2024-09-05 NOTE — ED NOTES
How does patient ambulate?   []Low Fall Risk (ambulates by themselves without support)  []Stand by assist   []Contact Guard   []Front wheel walker  [x]Wheelchair   []Steady  []Bed bound  []History of Lower Extremity Amputation  []Unknown, did not assess in the emergency department   How does patient take pills?  [x]Whole with Water  []Crushed in applesauce  []Crushed in pudding  []Other  []Unknown no oral medications were given in the ED  Is patient alert?   [x]Alert  []Drowsy but responds to voice  []Doesn't respond to voice but responds to painful stimuli  []Unresponsive  Is patient oriented?   [x]To person  [x]To place  [x]To time  [x]To situation  []Confused  []Agitated  [x]Follows commands  If patient is disoriented or from a Skill Nursing Facility has family been notified of admission?   []Yes   [x]No  Patient belongings?   [x]Cell phone  [x]Wallet   []Dentures  [x]Clothing  Any specific patient or family belongings/needs/dynamics?   Pt is paraplegic   Miscellaneous comments/pending orders?  Pt refused ceftriaxone and wanted to talk to the provider before taking it, MD aware.      If there are any additional questions please reach out to the Emergency Department.           Escuadra, Marylee, RN  09/05/24 0017

## 2024-09-05 NOTE — PROGRESS NOTES
4 Eyes Skin Assessment     NAME:  Russell Livingston  YOB: 1990  MEDICAL RECORD NUMBER:  [id number]    The patient is being assessed for  Admission    I agree that at least one RN has performed a thorough Head to Toe Skin Assessment on the patient. ALL assessment sites listed below have been assessed.      Areas assessed by both nurses:    Head, Face, Ears, Shoulders, Back, Chest, Arms, Elbows, Hands, Sacrum. Buttock, Coccyx, Ischium, Legs. Feet and Heels, and Under Medical Devices         Does the Patient have a Wound? No noted wound(s)       Jose Prevention initiated by RN: No  Wound Care Orders initiated by RN: No    Pressure Injury (Stage 3,4, Unstageable, DTI, NWPT, and Complex wounds) if present, place Wound referral order by RN under : No    New Ostomies, if present place, Ostomy referral order under : No     Nurse 1 eSignature: Electronically signed by MAGY GUERRERO RN on 9/5/24 at 4:47 AM EDT    **SHARE this note so that the co-signing nurse can place an eSignature**    Nurse 2 eSignature: Electronically signed by LAYLA LOUIS RN on 9/5/24 at 5:04 AM EDT

## 2024-09-05 NOTE — PROGRESS NOTES
Spiritual Health Assessment/Progress Note  [facility]    (P) Initial Encounter, Spiritual/Emotional Needs,  ,  ,      Name: Russell Livingston MRN: [id number]    Age: 34 y.o.     Sex: male   Language: English   Jainism: Non-Zoroastrianism   UTI (urinary tract infection)     Date: 9/5/2024            Total Time Calculated: (P) 3 min              Spiritual Assessment began in [address] SURGERY        Referral/Consult From: (P) Nurse   Encounter Overview/Reason: (P) Initial Encounter, Spiritual/Emotional Needs  Service Provided For: (P) Patient and family together    Debby, Belief, Meaning:   Patient is connected with a debby tradition or spiritual practice and has beliefs or practices that help with coping during difficult times  Family/Friends Other: family on phone during visit      Importance and Influence:  Patient has spiritual/personal beliefs that influence decisions regarding their health    Community:  Patient is connected with a spiritual community and feels well-supported. Support system includes: Other: Parent, [facility] aware pt in hospital    Assessment and Plan of Care:  Patient Interventions include: Facilitated expression of thoughts and feelings, Explored spiritual coping/struggle/distress and theological reflection, and Affirmed coping skills/support systems    Patient Plan of Care: Other: Spiritual Care will follow up    Electronically signed by ELIANA Mccrary on 9/5/2024 at 9:50 AM

## 2024-09-05 NOTE — ED PROVIDER NOTES
Seen /[facility]     EKG   Sinus tachycardia, rate 133, NV interval 134, QRS 80 ms, QTc 413.  No acute ischemic changes    ED BEDSIDE ULTRASOUND:  No results found.    MOST RECENT VITALS:  BP: (!) 174/102,Temp: 99 °F (37.2 °C), Pulse: (!) 122, Respirations: 17, SpO2: 98 %     Procedures         ED Course     Nursing Notes, Past Medical Hx, Past Surgical Hx, Social Hx,Allergies, and Family Hx were reviewed.         The patient was given the following medications:  Orders Placed This Encounter   Medications    lactated ringers bolus 1,000 mL    DISCONTD: LORazepam (ATIVAN) injection 0.5 mg    LORazepam (ATIVAN) injection 0.5 mg    lactated ringers bolus 1,000 mL    lactated ringers bolus 1,000 mL       CONSULTS:  None    Review of Systems     Review of Systems  Pertinent positives and negatives are listed in the HPI; otherwise all systems are reviewed and were negative.     Past Medical, Surgical, Family, and Social History     He has a past medical history of Anemia, Asthma, Hypertension, and Paralysis (HCC).  He has a past surgical history that includes Spine surgery (2009).  His family history includes Asthma in his father; Diabetes in his father and mother; High Blood Pressure in his father.  He reports that he has never smoked. He has never used smokeless tobacco. He reports that he does not drink alcohol and does not use drugs.    Medications     Previous Medications    ACETAMINOPHEN (TYLENOL) 500 MG TABLET    Take 1 tablet by mouth as needed    AMLODIPINE (NORVASC) 10 MG TABLET    Take 1 tablet by mouth daily    BACLOFEN (LIORESAL) 20 MG TABLET    Take 1 tablet by mouth 3 times daily    BISACODYL (DULCOLAX) 10 MG SUPPOSITORY    Place 1 suppository rectally daily    CHOLECALCIFEROL (VITAMIN D3) 1000 UNITS TABS    TAKE 2 TABLETS BY MOUTH DAILY.    FERROUS SULFATE (FE TABS) 325 (65 FE) MG EC TABLET    Take 1 tablet by mouth 2 times daily    FOLIC ACID (FOLVITE) 400 MCG TABLET    Take 1 tablet by mouth daily    GABAPENTIN  (NEURONTIN) 600 MG TABLET    Take 1 tablet by mouth 3 times daily.    MULTIPLE VITAMIN (MULTIVITAMIN PO)    Take  by mouth daily.      OXYBUTYNIN (DITROPAN) 5 MG TABLET    Take 1 tablet by mouth as needed For abdominal pain    TIZANIDINE (ZANAFLEX) 4 MG TABLET    Take 1 tablet by mouth every 8 hours as needed    VITAMIN B-12 (CYANOCOBALAMIN) 100 MCG TABLET    Take 0.5 tablets by mouth daily       Allergies     He is allergic to sulfa antibiotics and marshmallow [althaea officinalis].    Physical Exam     INITIAL VITALS: BP: (!) 144/96, Temp: 99 °F (37.2 °C), Pulse: (!) 135, Respirations: 16, SpO2: 99 %   Physical Exam  Constitutional:       Appearance: Normal appearance.   HENT:      Head: Normocephalic and atraumatic.   Eyes:      Pupils: Pupils are equal, round, and reactive to light.   Cardiovascular:      Rate and Rhythm: Regular rhythm. Tachycardia present.   Pulmonary:      Effort: Pulmonary effort is normal. No respiratory distress.      Breath sounds: No wheezing.   Abdominal:      General: Abdomen is flat. There is no distension.      Palpations: Abdomen is soft.      Tenderness: There is no abdominal tenderness. There is no guarding or rebound.   Musculoskeletal:         General: No swelling or tenderness. Normal range of motion.      Cervical back: Normal range of motion.   Neurological:      Mental Status: He is alert and oriented to person, place, and time. Mental status is at baseline.      Comments: Bilateral lower extremities baseline insensate, no movement.  Moves bilateral upper extremities with grossly preserved strength.                    Dane Vargas MD  09/05/24 0006

## 2024-09-05 NOTE — PROGRESS NOTES
Patient seen and admitted on this date.  See history and physical for full details    Briefly this is a 34-year-old male with a past medical history significant for paraplegia secondary to spinal injury, hypertension who presented to the emergency room with complaints of cloudy urine, anxiety and tachycardia.    In the emergency room patient was found to be tachycardic with white blood cell count of 16,000, elevated creatinine to 1.9 from baseline of 1.3 and urine consistent with urinary tract infection.    Patient was admitted and fluids and IV antibiotics were started    Patient states he feels better today overall.    Regular rate and rhythm, clear to auscultation.    Electronically signed by PETE Sparks CNP on 9/5/2024 at 10:55 AM

## 2024-09-05 NOTE — CARE COORDINATION
Case Management Assessment  Initial Evaluation    Date/Time of Evaluation: 9/5/2024 8:54 AM  Assessment Completed by: Annie Sethi RN    If patient is discharged prior to next notation, then this note serves as note for discharge by case management.    Patient Name: Russell Livingston                   YOB: 1990  Diagnosis: UTI (urinary tract infection) [N39.0]  Septicemia (HCC) [A41.9]  Acute cystitis without hematuria [N30.00]                   Date / Time: 9/4/2024  7:53 PM    Patient Admission Status: Inpatient   Readmission Risk (Low < 19, Mod (19-27), High > 27): Readmission Risk Score: 16.6    Current PCP: Unknown, Provider  PCP verified by CM? No (getting a new PCP but has not seen them yet)    Chart Reviewed: Yes      History Provided by: Patient  Patient Orientation: Alert and Oriented    Patient Cognition: Alert    Hospitalization in the last 30 days (Readmission):  No    If yes, Readmission Assessment in CM Navigator will be completed.    Advance Directives:      Code Status: Full Code   Patient's Primary Decision Maker is: Legal Next of Kin      Discharge Planning:    Patient lives with: Family Members Type of Home: House  Primary Care Giver: Self  Patient Support Systems include: Family Members   Current Financial resources: Medicare  Current community resources: None  Current services prior to admission: Durable Medical Equipment            Current DME: Other (Comment) (power chair-broken currently)            Type of Home Care services:  None    ADLS  Prior functional level: Independent in ADLs/IADLs  Current functional level: Independent in ADLs/IADLs    PT AM-PAC:   /24  OT AM-PAC:   /24    Family can provide assistance at DC: Yes  Would you like Case Management to discuss the discharge plan with any other family members/significant others, and if so, who? No  Plans to Return to Present Housing: Yes  Other Identified Issues/Barriers to RETURNING to current housing: n/a  Potential  patient's individualized plan of care/goals and shares the quality data associated with the providers was provided to: Patient   Patient Representative Name:       The Patient and/or Patient Representative Agree with the Discharge Plan? Yes    Annie Sethi RN  Case Management Department  Ph: [phone] Fax: [phone]

## 2024-09-05 NOTE — ED NOTES
Pt wants to talk to the provider before getting ceftriaxone, concerns that it will make his heart rate faster, informed Dr. Vargas about his concern, he said will give him fluids for now.      Escuadra, Marylee, RN  09/05/24 0000

## 2024-09-05 NOTE — PROGRESS NOTES
Patient transferred to room 5322 from ED. Patient is A&O x 4. VSS. Patient oriented to the room all safety measures in place. Patient on Tele box [id number]. Admission orders released and patient 4 eyes completed. Admission documentation completed. No other needs are noted at this time.    [x] Bed alarm on and cord plugged into wall  [x] Bed in lowest position  [x] Call light and bedside table within reach  [x] Patient educated on all safety measures  []Oxygen connected to wall (if applicable)     Nurse 1 Esignature: Electronically signed by MAGY GUERRERO RN on 9/5/24 at 4:48 AM EDT  Nurse 2 Esignature: Electronically signed by LAYLA LOUIS, RN on 9/5/24 at 5:04 AM EDT

## 2024-09-05 NOTE — H&P
Hospital Medicine History & Physical      Date of Admission: 9/4/2024    Date of Service:  Pt seen/examined on 09/05/24     [x]Admitted to Inpatient with expected LOS greater than two midnights due to medical therapy.  []Placed in Observation status.    Chief Admission Complaint: Not feeling    Presenting Admission History:      34 y.o. male with PMHx significant for paraplegia secondary to spinal injury, essential hypertension who presented to ED with a complaint of not feeling well.  Patient started having heart racing as well as anxiety.  Patient indicates that his urine has been more cloudy over the last couple of days.  Patient has history of neurogenic bladder and uses straight catheterization to void.  Patient denies any fever or chills.  No abdominal pain or any other complaints at this time.  In ED workup was remarkable for UA which shows evidence of urinary tract infection.  Patient received IV Rocephin in ED.  Labs also remarkable for elevated creatinine of 1.9 from baseline of 1.3.  Patient is currently being admitted under inpatient status for further management and evaluation.    ROS:     Review of 10 systems is negative except what is outlined in HPI.     Assessment:  Urinary tract infection.  Sepsis, due to above.  Neurogenic bladder, at baseline patient uses straight cath for voiding.  Paraplegia s/p spinal injury.  Acute kidney injury, likely prerenal.  Anemia of chronic disease with stable hemoglobin.  Essential hypertension  Morbid obesity, Body mass index is 45.47 kg/m².     Plan:    Patient is admitted under inpatient status.  Start IV Rocephin.  Follow-up urine and blood cultures.  Continue IV fluids for now.  Monitor renal function.  Avoid nephrotoxic agents.  Patient uses straight catheterization for voiding will continue for now.  Resume resume home medications as appropriate.  SQ Lovenox for DVT prophylaxis.  CODE STATUS: Full code    Physical Exam Performed:      /83   Pulse (!)

## 2024-09-05 NOTE — PROGRESS NOTES
Patient refused iron pill this time because he takes it once a day in morning at home. Dr. Kahn-Demi notified.

## 2024-09-05 NOTE — PROGRESS NOTES
Patient refused Rocephin in ED, and he wants to take Amoxicillin which did not rise up his heart rate last time when he was here. Dr. Dunlap notified.

## 2024-09-06 VITALS
TEMPERATURE: 99 F | HEART RATE: 99 BPM | SYSTOLIC BLOOD PRESSURE: 142 MMHG | WEIGHT: 315 LBS | HEIGHT: 71 IN | DIASTOLIC BLOOD PRESSURE: 86 MMHG | BODY MASS INDEX: 44.1 KG/M2 | OXYGEN SATURATION: 99 % | RESPIRATION RATE: 18 BRPM

## 2024-09-06 LAB
ANION GAP SERPL CALCULATED.3IONS-SCNC: 12 MMOL/L (ref 3–16)
BACTERIA BLD CULT ORG #2: NORMAL
BACTERIA BLD CULT: NORMAL
BACTERIA UR CULT: ABNORMAL
BUN SERPL-MCNC: 21 MG/DL (ref 7–20)
CALCIUM SERPL-MCNC: 8.6 MG/DL (ref 8.3–10.6)
CHLORIDE SERPL-SCNC: 105 MMOL/L (ref 99–110)
CO2 SERPL-SCNC: 23 MMOL/L (ref 21–32)
CREAT SERPL-MCNC: 1.2 MG/DL (ref 0.9–1.3)
DEPRECATED RDW RBC AUTO: 16 % (ref 12.4–15.4)
GFR SERPLBLD CREATININE-BSD FMLA CKD-EPI: 80 ML/MIN/{1.73_M2}
GLUCOSE SERPL-MCNC: 117 MG/DL (ref 70–99)
HCT VFR BLD AUTO: 31.3 % (ref 40.5–52.5)
HGB BLD-MCNC: 10.2 G/DL (ref 13.5–17.5)
MAGNESIUM SERPL-MCNC: 1.9 MG/DL (ref 1.8–2.4)
MCH RBC QN AUTO: 27.7 PG (ref 26–34)
MCHC RBC AUTO-ENTMCNC: 32.6 G/DL (ref 31–36)
MCV RBC AUTO: 84.8 FL (ref 80–100)
ORGANISM: ABNORMAL
PLATELET # BLD AUTO: 299 K/UL (ref 135–450)
PMV BLD AUTO: 7.1 FL (ref 5–10.5)
POTASSIUM SERPL-SCNC: 3.9 MMOL/L (ref 3.5–5.1)
RBC # BLD AUTO: 3.69 M/UL (ref 4.2–5.9)
SODIUM SERPL-SCNC: 140 MMOL/L (ref 136–145)
WBC # BLD AUTO: 6.2 K/UL (ref 4–11)

## 2024-09-06 PROCEDURE — 6360000002 HC RX W HCPCS: Performed by: NURSE PRACTITIONER

## 2024-09-06 PROCEDURE — 83735 ASSAY OF MAGNESIUM: CPT

## 2024-09-06 PROCEDURE — 6370000000 HC RX 637 (ALT 250 FOR IP): Performed by: INTERNAL MEDICINE

## 2024-09-06 PROCEDURE — 36415 COLL VENOUS BLD VENIPUNCTURE: CPT

## 2024-09-06 PROCEDURE — 85027 COMPLETE CBC AUTOMATED: CPT

## 2024-09-06 PROCEDURE — 80048 BASIC METABOLIC PNL TOTAL CA: CPT

## 2024-09-06 RX ORDER — DIPHENHYDRAMINE HYDROCHLORIDE 50 MG/ML
25 INJECTION INTRAMUSCULAR; INTRAVENOUS ONCE
Status: DISCONTINUED | OUTPATIENT
Start: 2024-09-06 | End: 2024-09-06 | Stop reason: HOSPADM

## 2024-09-06 RX ORDER — CEFDINIR 300 MG/1
300 CAPSULE ORAL 2 TIMES DAILY
Qty: 14 CAPSULE | Refills: 0 | Status: SHIPPED | OUTPATIENT
Start: 2024-09-06 | End: 2024-09-13

## 2024-09-06 RX ORDER — PROCHLORPERAZINE EDISYLATE 5 MG/ML
10 INJECTION INTRAMUSCULAR; INTRAVENOUS ONCE
Status: COMPLETED | OUTPATIENT
Start: 2024-09-06 | End: 2024-09-06

## 2024-09-06 RX ORDER — HYDRALAZINE HYDROCHLORIDE 20 MG/ML
5 INJECTION INTRAMUSCULAR; INTRAVENOUS ONCE
Status: DISCONTINUED | OUTPATIENT
Start: 2024-09-06 | End: 2024-09-06 | Stop reason: HOSPADM

## 2024-09-06 RX ADMIN — PROCHLORPERAZINE EDISYLATE 10 MG: 5 INJECTION INTRAMUSCULAR; INTRAVENOUS at 08:21

## 2024-09-06 RX ADMIN — BACLOFEN 20 MG: 10 TABLET ORAL at 08:21

## 2024-09-06 RX ADMIN — GABAPENTIN 600 MG: 600 TABLET, FILM COATED ORAL at 08:20

## 2024-09-06 RX ADMIN — Medication 2000 UNITS: at 08:20

## 2024-09-06 RX ADMIN — TIZANIDINE 4 MG: 4 TABLET ORAL at 10:54

## 2024-09-06 RX ADMIN — VITAM B12 50 MCG: 100 TAB at 08:20

## 2024-09-06 RX ADMIN — OXYBUTYNIN CHLORIDE 5 MG: 5 TABLET ORAL at 10:54

## 2024-09-06 RX ADMIN — OXYBUTYNIN CHLORIDE 5 MG: 5 TABLET ORAL at 02:35

## 2024-09-06 RX ADMIN — GABAPENTIN 600 MG: 600 TABLET, FILM COATED ORAL at 13:29

## 2024-09-06 RX ADMIN — AMLODIPINE BESYLATE 10 MG: 10 TABLET ORAL at 08:20

## 2024-09-06 RX ADMIN — TIZANIDINE 4 MG: 4 TABLET ORAL at 02:35

## 2024-09-06 RX ADMIN — FOLIC ACID TAB 400 MCG 400 MCG: 400 TAB at 08:20

## 2024-09-06 RX ADMIN — BACLOFEN 20 MG: 10 TABLET ORAL at 13:29

## 2024-09-06 RX ADMIN — FERROUS SULFATE TAB 325 MG (65 MG ELEMENTAL FE) 325 MG: 325 (65 FE) TAB at 08:24

## 2024-09-06 NOTE — CARE COORDINATION
Case Management Assessment            Discharge Note                    Date / Time of Note: 9/6/2024 3:50 PM                  Discharge Note Completed by: Annie Sethi RN    Patient Name: Russell Livingston   YOB: 1990  Diagnosis: UTI (urinary tract infection) [N39.0]  Septicemia (HCC) [A41.9]  Acute cystitis without hematuria [N30.00]   Date / Time: 9/4/2024  7:53 PM    Current PCP: Unknown, Provider  Clinic patient: No    Hospitalization in the last 30 days: Yes  Readmission Assessment  Number of Days since last admission?: 8-30 days  Previous Disposition: Home with Family  Who is being Interviewed: Patient  What was the patient's/caregiver's perception as to why they think they needed to return back to the hospital?: Other (Comment) (change in medical condition)  Did you visit your Primary Care Physician after you left the hospital, before you returned this time?: No  Why weren't you able to visit your PCP?: Could not get an appointment  Did you see a specialist, such as Cardiac, Pulmonary, Orthopedic Physician, etc. after you left the hospital?: No  Who advised the patient to return to the hospital?: Self-referral  Does the patient report anything that got in the way of taking their medications?: No  In our efforts to provide the best possible care to you and others like you, can you think of anything that we could have done to help you after you left the hospital the first time, so that you might not have needed to return so soon?: Discharge instructions that are concise, clear, and non contradictory, Improved written discharge instructions    Advance Directives:  Code Status: Full Code  [place] DNR form completed and on chart: No    Financial:  Payor: [facility] MEDICARE / Plan: [facility] MEDICARE ADVANTAGE / Product Type: *No Product type* /      Pharmacy:    [id number] [employer] [facility] [place] [id number] [facility] [phone] -  [phone] [address] [facility] [address]  Phone:  or quad: meets the following criteria 1) unable to transfer self to chair, 2) has no extremity mobility or control  Name of Transport Company: [employer]  Phone: [phone]  Time of Transport: 1630    Transport form completed: Yes    Additional CM Notes: Patient cleared for d/c to home. He has no home care needs. Transport arranged via [facility] for 1630.     COVID Result:  No results found for: \"COVID19\"    The Plan for Transition of Care is related to the following treatment goals of UTI (urinary tract infection) [N39.0]  Septicemia (HCC) [A41.9]  Acute cystitis without hematuria [N30.00]    The Patient and/or patient representative Russell and his family were provided with a choice of provider and agrees with the discharge plan Yes    Freedom of choice list was provided with basic dialogue that supports the patient's individualized plan of care/goals and shares the quality data associated with the providers. Yes    Care Transitions patient: No    Annie Sethi RN  [facility]  Case Management Department  Ph: [phone]  Fax: [phone]

## 2024-09-06 NOTE — PLAN OF CARE
Problem: Discharge Planning  Goal: Discharge to home or other facility with appropriate resources  9/6/2024 1326 by Grover Moore RN  Outcome: Adequate for Discharge  9/6/2024 0847 by Grover Moore RN  Outcome: Progressing     Problem: Pain  Goal: Verbalizes/displays adequate comfort level or baseline comfort level  9/6/2024 1326 by Grover Moore RN  Outcome: Adequate for Discharge  9/6/2024 0847 by Grover Moore RN  Outcome: Progressing     Problem: Skin/Tissue Integrity  Goal: Absence of new skin breakdown  Description: 1.  Monitor for areas of redness and/or skin breakdown  2.  Assess vascular access sites hourly  3.  Every 4-6 hours minimum:  Change oxygen saturation probe site  4.  Every 4-6 hours:  If on nasal continuous positive airway pressure, respiratory therapy assess nares and determine need for appliance change or resting period.  9/6/2024 1326 by Grover Moore RN  Outcome: Adequate for Discharge  9/6/2024 0847 by Grover Moore RN  Outcome: Progressing     Problem: Safety - Adult  Goal: Free from fall injury  9/6/2024 1326 by Grover Moore RN  Outcome: Adequate for Discharge  9/6/2024 0847 by Grover Moore RN  Outcome: Progressing

## 2024-09-06 NOTE — DISCHARGE SUMMARY
V2.0  Discharge Summary    Name:  Russell Livingston /Age/Sex: 1990 (34 y.o. male)   Admit Date: 2024  Discharge Date: 24    MRN & CSN:  [id number] & [id number] Encounter Date and Time 24 1:39 PM EDT    Attending:  Alvaro Cruz* Discharging Provider: PETE Sparks [facility] Course:     Brief HPI: Russell Livingston is a 34 y.o. male who presented to the emergency room with complaints of cloudy urine, anxiety and tachycardia due to UTI.    In the emergency room patient was found to be tachycardic with white blood cell count 16,000, creatinine elevated to 1.9 from baseline of 1.3 and urinalysis consistent with urinary tract infection.    Patient was admitted to IV fluids and antibiotics were initiated.  Urine culture grew E. coli.  Patient's clinical condition improved.  He was discharged in a stable condition to complete his course of antibiotics as an outpatient.    Brief Problem Based Course:   Sepsis-secondary to UTI-patient received ceftriaxone.  Urine culture grew E. coli.  Discharged on cefdinir with instructions to contact PCP office for sensitivity results.  Hypertension-continued Norvasc  Lesion with spasticity-continue baclofen and tizanidine  Anemia-continue iron  JER-patient's baseline creatinine 1.3. Admitted with 1.9 increased to 2.2.  Neurogenic bladder-patient straight cath for voiding        The patient expressed appropriate understanding of, and agreement with the discharge recommendations, medications, and plan.     Consults this admission:  None    Discharge Diagnosis:   UTI (urinary tract infection)        Discharge Instruction:   Follow up appointments: Follow-up with your primary care  Primary care physician: Unknown, Provider within 2 weeks  Diet: regular diet   Activity: activity as tolerated  Disposition: Discharged to:   [x]Home, []C, []SNF, []Acute Rehab, []Hospice   Condition on discharge: Stable  Labs and Tests to be Followed up as an outpatient  by PCP or Specialist: Urine culture sensitivities    Discharge Medications:        Medication List        START taking these medications      cefdinir 300 MG capsule  Commonly known as: OMNICEF  Take 1 capsule by mouth 2 times daily for 7 days            CONTINUE taking these medications      acetaminophen 500 MG tablet  Commonly known as: TYLENOL     amLODIPine 10 MG tablet  Commonly known as: NORVASC     baclofen 20 MG tablet  Commonly known as: LIORESAL     bisacodyl 10 MG suppository  Commonly known as: DULCOLAX     ferrous sulfate 325 (65 Fe) MG EC tablet  Commonly known as: Fe Tabs  Take 1 tablet by mouth 2 times daily     folic acid 400 MCG tablet  Commonly known as: FOLVITE     gabapentin 600 MG tablet  Commonly known as: NEURONTIN     MULTIVITAMIN PO     oxyBUTYnin 5 MG tablet  Commonly known as: DITROPAN     tiZANidine 4 MG tablet  Commonly known as: ZANAFLEX     vitamin B-12 100 MCG tablet  Commonly known as: CYANOCOBALAMIN     vitamin D3 25 MCG (1000 UT) Tabs tablet  Commonly known as: CHOLECALCIFEROL  TAKE 2 TABLETS BY MOUTH DAILY.               Where to Get Your Medications        These medications were sent to [id number] - [place], [place] - [address] - P [phone] - F [phone] [address] [facility] [address]      Phone: [phone]   cefdinir 300 MG capsule        Objective Findings at Discharge:   BP (!) 142/86   Pulse 99   Temp 99 °F (37.2 °C) (Oral)   Resp 18   Ht 1.803 m (5' 11\")   Wt (!) 147.9 kg (326 lb)   SpO2 99%   BMI 45.47 kg/m²       Physical Exam:     General: NAD  Eyes: EOMI  ENT: neck supple  Cardiovascular: Regular rate.  Respiratory: Clear to auscultation  Gastrointestinal: Soft, non tender  Genitourinary: no suprapubic tenderness  Musculoskeletal: Paraplegic with spasticity skin: warm, dry  Neuro: Alert.  Psych: Mood appropriate.         Labs and Imaging   XR CHEST (2 VW)    Result Date: 9/4/2024  PA AND LATERAL CHEST X-RAY:

## 2024-09-06 NOTE — PROGRESS NOTES
Patient discharge education complete. IV removed and dressing placed. Patient verbalized understanding of medications and side effects at time of discharge. All questions answered at this time.

## 2024-09-08 NOTE — PROGRESS NOTES
Physician Progress Note      PATIENT:               BLANQUITA LORENZ  CSN #:                  [id number]  :                       1990  ADMIT DATE:       2024 7:53 PM  DISCH DATE:        2024 4:34 PM  RESPONDING  PROVIDER #:        Alvaro Martini MD          QUERY TEXT:    Pt admitted with UTI.  Pt noted to perform straight cath to void. If possible,   please document in the progress notes and discharge summary if you are   evaluating and/or treating any of the following:    The medical record reflects the following:  Risk Factors: 35 yo morbidly obese male w/ neurogenic bladder, st caths to   void  Clinical Indicators: Per the H&P:    Urinary tract infection.  Neurogenic   bladder, at baseline patient uses straight cath for voiding.  Treatment: UA, urine culture, IV Rocephin  Options provided:  -- UTI due to self catheterization  -- UTI not due to self catheterization  -- Other - I will add my own diagnosis  -- Disagree - Not applicable / Not valid  -- Disagree - Clinically unable to determine / Unknown  -- Refer to Clinical Documentation Reviewer    PROVIDER RESPONSE TEXT:    UTI is not due to self catheterization.    Query created by: Felicita Velez on 2024 6:16 AM      Electronically signed by:  Alvaro Martini MD 2024 5:04 PM

## 2024-09-09 LAB
BACTERIA BLD CULT ORG #2: NORMAL
BACTERIA BLD CULT: NORMAL

## 2024-10-05 PROBLEM — N39.0 UTI (URINARY TRACT INFECTION): Status: RESOLVED | Noted: 2024-09-05 | Resolved: 2024-10-05

## 2024-10-27 ENCOUNTER — HOSPITAL ENCOUNTER (EMERGENCY)
Age: 34
Discharge: HOME | End: 2024-10-27
Attending: STUDENT IN AN ORGANIZED HEALTH CARE EDUCATION/TRAINING PROGRAM
Payer: MEDICARE

## 2024-10-27 VITALS
RESPIRATION RATE: 18 BRPM | HEART RATE: 100 BPM | TEMPERATURE: 98.2 F | OXYGEN SATURATION: 97 % | BODY MASS INDEX: 45.47 KG/M2 | SYSTOLIC BLOOD PRESSURE: 139 MMHG | HEIGHT: 71 IN | DIASTOLIC BLOOD PRESSURE: 90 MMHG

## 2024-10-27 DIAGNOSIS — I10 ESSENTIAL HYPERTENSION: ICD-10-CM

## 2024-10-27 DIAGNOSIS — F41.1 ANXIETY STATE: Primary | ICD-10-CM

## 2024-10-27 DIAGNOSIS — R51.9 NONINTRACTABLE HEADACHE, UNSPECIFIED CHRONICITY PATTERN, UNSPECIFIED HEADACHE TYPE: ICD-10-CM

## 2024-10-27 LAB
EKG ATRIAL RATE: 83 BPM
EKG DIAGNOSIS: NORMAL
EKG P AXIS: 58 DEGREES
EKG P-R INTERVAL: 152 MS
EKG Q-T INTERVAL: 356 MS
EKG QRS DURATION: 78 MS
EKG QTC CALCULATION (BAZETT): 418 MS
EKG R AXIS: 55 DEGREES
EKG T AXIS: 35 DEGREES
EKG VENTRICULAR RATE: 83 BPM

## 2024-10-27 PROCEDURE — 99283 EMERGENCY DEPT VISIT LOW MDM: CPT

## 2024-10-27 PROCEDURE — 6370000000 HC RX 637 (ALT 250 FOR IP): Performed by: STUDENT IN AN ORGANIZED HEALTH CARE EDUCATION/TRAINING PROGRAM

## 2024-10-27 PROCEDURE — 93005 ELECTROCARDIOGRAM TRACING: CPT | Performed by: STUDENT IN AN ORGANIZED HEALTH CARE EDUCATION/TRAINING PROGRAM

## 2024-10-27 RX ORDER — ACETAMINOPHEN 500 MG
1000 TABLET ORAL
Status: COMPLETED | OUTPATIENT
Start: 2024-10-27 | End: 2024-10-27

## 2024-10-27 RX ORDER — HYDROXYZINE PAMOATE 25 MG/1
25 CAPSULE ORAL ONCE
Status: COMPLETED | OUTPATIENT
Start: 2024-10-27 | End: 2024-10-27

## 2024-10-27 RX ADMIN — HYDROXYZINE PAMOATE 25 MG: 25 CAPSULE ORAL at 06:06

## 2024-10-27 RX ADMIN — ACETAMINOPHEN 1000 MG: 500 TABLET ORAL at 05:28

## 2024-10-27 ASSESSMENT — PAIN SCALES - GENERAL: PAINLEVEL_OUTOF10: 7

## 2024-10-27 ASSESSMENT — PAIN - FUNCTIONAL ASSESSMENT: PAIN_FUNCTIONAL_ASSESSMENT: NONE - DENIES PAIN

## 2024-10-27 NOTE — ED PROVIDER NOTES
[facility]  EMERGENCY DEPARTMENT ENCOUNTER          ATTENDING PHYSICIAN NOTE       Date of evaluation: 10/27/2024    Chief Complaint     Fatigue (Patient complaint of fatigue and insomnia for 1 week with hypertension starting today)      History of Present Illness     Russell Livingston is a 34 y.o. male who presents with past medical history of paraplegia due to trauma, hypertension, and anxiety presenting with concern for headache, hypertension, and intermittent chest pain.  Additionally, the patient's concerned as he has been having trouble sleeping for the last week.  He states that he has an appointment with his new primary care provider in the coming 2 weeks but has been noticing that his blood pressure has been high at home and wanted to be evaluated.  He also noticed that he had an episode of chest pain 2 days ago and was unsure if that was related.  He states he thinks he does have anxiety and is always been reluctant to take anything.  He also complains of a mild headache on the left side of his head that he describes as a tension-like feeling.  Denies fevers or chills, nausea or vomiting, abdominal pain    ASSESSMENT / PLAN  (MEDICAL DECISION MAKING)     INITIAL VITALS: BP: (!) 177/108, Temp: 98.2 °F (36.8 °C), Pulse: 100, Respirations: 18, SpO2: 100 %      Russell Livingston is a 34 y.o. male presenting with anxiety, hypertension, insomnia, and an episode of chest pain.    On examination, the patient is hemodynamically stable with mild hypertension.  Long discussion had with patient regarding medication changes and anxiety.  He is already on Norvasc for his high blood pressure and is following up with his PCP in the coming weeks for that.  I suggested against making any changes to that today given his mild blood pressure elevation and his lack of clear symptoms today.    Long discussion regarding medication for anxiety.  The patient is still reluctant to take any anxiety medication on a long-term basis.  He

## 2024-10-29 ENCOUNTER — APPOINTMENT (OUTPATIENT)
Dept: GENERAL RADIOLOGY | Age: 34
End: 2024-10-29
Attending: EMERGENCY MEDICINE
Payer: MEDICARE

## 2024-10-29 ENCOUNTER — APPOINTMENT (OUTPATIENT)
Dept: CT IMAGING | Age: 34
End: 2024-10-29
Payer: MEDICARE

## 2024-10-29 ENCOUNTER — HOSPITAL ENCOUNTER (EMERGENCY)
Age: 34
Discharge: HOME | End: 2024-10-29
Attending: EMERGENCY MEDICINE
Payer: MEDICARE

## 2024-10-29 VITALS
RESPIRATION RATE: 15 BRPM | DIASTOLIC BLOOD PRESSURE: 107 MMHG | OXYGEN SATURATION: 100 % | TEMPERATURE: 98.3 F | SYSTOLIC BLOOD PRESSURE: 172 MMHG | HEART RATE: 98 BPM

## 2024-10-29 DIAGNOSIS — R07.9 CHEST PAIN, UNSPECIFIED TYPE: ICD-10-CM

## 2024-10-29 DIAGNOSIS — I10 HYPERTENSION, UNSPECIFIED TYPE: Primary | ICD-10-CM

## 2024-10-29 LAB
ANION GAP SERPL CALCULATED.3IONS-SCNC: 11 MMOL/L (ref 3–16)
BASOPHILS # BLD: 0 K/UL (ref 0–0.2)
BASOPHILS NFR BLD: 0.3 %
BUN SERPL-MCNC: 15 MG/DL (ref 7–20)
CALCIUM SERPL-MCNC: 9.3 MG/DL (ref 8.3–10.6)
CHLORIDE SERPL-SCNC: 103 MMOL/L (ref 99–110)
CO2 SERPL-SCNC: 24 MMOL/L (ref 21–32)
CREAT SERPL-MCNC: 1.3 MG/DL (ref 0.9–1.3)
D-DIMER QUANTITATIVE: 3.6 UG/ML FEU (ref 0–0.6)
DEPRECATED RDW RBC AUTO: 16.3 % (ref 12.4–15.4)
EOSINOPHIL # BLD: 0.2 K/UL (ref 0–0.6)
EOSINOPHIL NFR BLD: 2.7 %
GFR SERPLBLD CREATININE-BSD FMLA CKD-EPI: 73 ML/MIN/{1.73_M2}
GLUCOSE SERPL-MCNC: 133 MG/DL (ref 70–99)
HCT VFR BLD AUTO: 36.8 % (ref 40.5–52.5)
HGB BLD-MCNC: 12.1 G/DL (ref 13.5–17.5)
LYMPHOCYTES # BLD: 1.4 K/UL (ref 1–5.1)
LYMPHOCYTES NFR BLD: 22.6 %
MAGNESIUM SERPL-MCNC: 2.1 MG/DL (ref 1.8–2.4)
MCH RBC QN AUTO: 27.1 PG (ref 26–34)
MCHC RBC AUTO-ENTMCNC: 32.9 G/DL (ref 31–36)
MCV RBC AUTO: 82.6 FL (ref 80–100)
MONOCYTES # BLD: 0.3 K/UL (ref 0–1.3)
MONOCYTES NFR BLD: 4.8 %
NEUTROPHILS # BLD: 4.4 K/UL (ref 1.7–7.7)
NEUTROPHILS NFR BLD: 69.6 %
NT-PROBNP SERPL-MCNC: 37 PG/ML (ref 0–124)
PLATELET # BLD AUTO: 406 K/UL (ref 135–450)
PMV BLD AUTO: 7.5 FL (ref 5–10.5)
POTASSIUM SERPL-SCNC: 4.8 MMOL/L (ref 3.5–5.1)
RBC # BLD AUTO: 4.46 M/UL (ref 4.2–5.9)
SODIUM SERPL-SCNC: 138 MMOL/L (ref 136–145)
TROPONIN, HIGH SENSITIVITY: 21 NG/L (ref 0–22)
TROPONIN, HIGH SENSITIVITY: 21 NG/L (ref 0–22)
WBC # BLD AUTO: 6.3 K/UL (ref 4–11)

## 2024-10-29 PROCEDURE — 80048 BASIC METABOLIC PNL TOTAL CA: CPT

## 2024-10-29 PROCEDURE — 84484 ASSAY OF TROPONIN QUANT: CPT

## 2024-10-29 PROCEDURE — 71260 CT THORAX DX C+: CPT

## 2024-10-29 PROCEDURE — 6360000004 HC RX CONTRAST MEDICATION: Performed by: EMERGENCY MEDICINE

## 2024-10-29 PROCEDURE — 85025 COMPLETE CBC W/AUTO DIFF WBC: CPT

## 2024-10-29 PROCEDURE — 83880 ASSAY OF NATRIURETIC PEPTIDE: CPT

## 2024-10-29 PROCEDURE — 99285 EMERGENCY DEPT VISIT HI MDM: CPT

## 2024-10-29 PROCEDURE — 71045 X-RAY EXAM CHEST 1 VIEW: CPT

## 2024-10-29 PROCEDURE — 93005 ELECTROCARDIOGRAM TRACING: CPT | Performed by: EMERGENCY MEDICINE

## 2024-10-29 PROCEDURE — 85379 FIBRIN DEGRADATION QUANT: CPT

## 2024-10-29 PROCEDURE — 83735 ASSAY OF MAGNESIUM: CPT

## 2024-10-29 RX ORDER — HYDRALAZINE HYDROCHLORIDE 25 MG/1
25 TABLET, FILM COATED ORAL 3 TIMES DAILY PRN
Qty: 30 TABLET | Refills: 0 | Status: SHIPPED | OUTPATIENT
Start: 2024-10-29

## 2024-10-29 RX ORDER — METOPROLOL SUCCINATE 25 MG/1
25 TABLET, EXTENDED RELEASE ORAL
Qty: 30 TABLET | Refills: 0 | Status: SHIPPED | OUTPATIENT
Start: 2024-10-29 | End: 2024-10-29

## 2024-10-29 RX ORDER — IOPAMIDOL 755 MG/ML
75 INJECTION, SOLUTION INTRAVASCULAR
Status: COMPLETED | OUTPATIENT
Start: 2024-10-29 | End: 2024-10-29

## 2024-10-29 RX ORDER — METOPROLOL SUCCINATE 25 MG/1
25 TABLET, EXTENDED RELEASE ORAL
Qty: 30 TABLET | Refills: 0 | Status: SHIPPED | OUTPATIENT
Start: 2024-10-29

## 2024-10-29 RX ORDER — HYDRALAZINE HYDROCHLORIDE 25 MG/1
25 TABLET, FILM COATED ORAL 3 TIMES DAILY PRN
Qty: 30 TABLET | Refills: 0 | Status: SHIPPED | OUTPATIENT
Start: 2024-10-29 | End: 2024-10-29

## 2024-10-29 RX ADMIN — IOPAMIDOL 75 ML: 755 INJECTION, SOLUTION INTRAVENOUS at 08:44

## 2024-10-29 NOTE — DISCHARGE INSTRUCTIONS
Please discuss referral to sleep specialist with your primary care provider.  Please return for any worsening or new concerning symptoms.

## 2024-10-29 NOTE — CONSULTS
[facility]  Cardiology Inpatient Consult Service                                                                                          Pt Name: Russell Livingston  Age: 34 y.o.  Sex: male  : 1990  Location: 1/A01-01    Referring Physician: Clarence Hills MD  Primary cardiologist :     Reason for Consult:     Reason for Consultation/Chief Complaint: Neck discomfort    HPI:      Russell Livingston is a 34 y.o. male with a past medical history of hypertension who presented to the hospital with fatigue and high blood pressure.  States that he has been taking multiple naps during the day.  Has excessive daytime fatigue.  Does not sleep well at night.  Has been having weird dreams.  Has history of snoring.  Has history of apneic episodes per family.  Noted the blood pressure was high.  States that his PCP has retired and he is waiting to see a new PCP.  Came to the emergency room for further evaluation.  States that he gets anxiety attacks.  Has been having some pressure in the back of his neck and head.  No syncope.  No bleeding.  No embolic symptoms.  No recent travel.  No history of alcohol or smoking.  Has family history of heart disease.  No GI or urinary symptoms.    Histories     Past Medical History:   has a past medical history of Anemia, Asthma, Hypertension, and Paralysis (HCC).    Surgical History:   has a past surgical history that includes Spine surgery ().     Social History:   reports that he has never smoked. He has never used smokeless tobacco. He reports that he does not drink alcohol and does not use drugs.     Family History:  Positive for heart disease.  Mother had heart failure      Medications:       Home Medications  Were reviewed and are listed in nursing record. and/or listed below  Prior to Admission medications    Medication Sig Start Date End Date Taking? Authorizing Provider   hydrALAZINE (APRESOLINE) 25 MG tablet Take 1 tablet by mouth 3 times daily as

## 2024-10-29 NOTE — ED PROVIDER NOTES
never smoked. He has never used smokeless tobacco. He reports that he does not drink alcohol and does not use drugs.    Medications     Previous Medications    ACETAMINOPHEN (TYLENOL) 500 MG TABLET    Take 1 tablet by mouth as needed    AMLODIPINE (NORVASC) 10 MG TABLET    Take 1 tablet by mouth daily    BACLOFEN (LIORESAL) 20 MG TABLET    Take 1 tablet by mouth 3 times daily    BISACODYL (DULCOLAX) 10 MG SUPPOSITORY    Place 1 suppository rectally daily    CHOLECALCIFEROL (VITAMIN D3) 1000 UNITS TABS    TAKE 2 TABLETS BY MOUTH DAILY.    FERROUS SULFATE (FE TABS) 325 (65 FE) MG EC TABLET    Take 1 tablet by mouth 2 times daily    FOLIC ACID (FOLVITE) 400 MCG TABLET    Take 1 tablet by mouth daily    GABAPENTIN (NEURONTIN) 600 MG TABLET    Take 1 tablet by mouth 3 times daily.    MULTIPLE VITAMIN (MULTIVITAMIN PO)    Take  by mouth daily.      OXYBUTYNIN (DITROPAN) 5 MG TABLET    Take 1 tablet by mouth as needed For abdominal pain    TIZANIDINE (ZANAFLEX) 4 MG TABLET    Take 1 tablet by mouth every 8 hours as needed    VITAMIN B-12 (CYANOCOBALAMIN) 100 MCG TABLET    Take 0.5 tablets by mouth daily       Allergies     He is allergic to sulfa antibiotics and marshmallow [althaea officinalis].    Physical Exam     INITIAL VITALS: BP: (!) 175/97, Temp: 98.3 °F (36.8 °C), Pulse: 98, Respirations: 15, SpO2: 100 %   General: 34-year-old male sitting in a wheelchair in no apparent cardiorespiratory distress  HEENT:  head is atraumatic, pupils equal round and reactive to light, sclera are clear, oropharynx is nonerythematous  Neck: supple, no lymphadenopathy  Chest: nonlabored respirations, equal chest rise bilaterally, no accessory muscle use, clear to auscultation bilaterally  Cardiovascular: Regular, rate, and rhythm, 2+ radial pulses bilaterally, capillary refill 2 seconds, no evidence of any lower extremity edema  Abdominal: Soft, nontender, nondistended, no rebound or guarding  Skin: Warm, dry well perfused, no

## 2024-11-01 LAB
EKG ATRIAL RATE: 93 BPM
EKG DIAGNOSIS: NORMAL
EKG P AXIS: 51 DEGREES
EKG P-R INTERVAL: 126 MS
EKG Q-T INTERVAL: 344 MS
EKG QRS DURATION: 88 MS
EKG QTC CALCULATION (BAZETT): 427 MS
EKG R AXIS: 10 DEGREES
EKG T AXIS: 57 DEGREES
EKG VENTRICULAR RATE: 93 BPM